# Patient Record
Sex: FEMALE | Race: WHITE | URBAN - METROPOLITAN AREA
[De-identification: names, ages, dates, MRNs, and addresses within clinical notes are randomized per-mention and may not be internally consistent; named-entity substitution may affect disease eponyms.]

---

## 2017-04-19 ENCOUNTER — TRANSFERRED RECORDS (OUTPATIENT)
Dept: HEALTH INFORMATION MANAGEMENT | Facility: CLINIC | Age: 47
End: 2017-04-19

## 2017-05-02 ENCOUNTER — TRANSFERRED RECORDS (OUTPATIENT)
Dept: HEALTH INFORMATION MANAGEMENT | Facility: CLINIC | Age: 47
End: 2017-05-02

## 2017-05-03 ENCOUNTER — MEDICAL CORRESPONDENCE (OUTPATIENT)
Dept: HEALTH INFORMATION MANAGEMENT | Facility: CLINIC | Age: 47
End: 2017-05-03

## 2017-05-08 ENCOUNTER — TRANSFERRED RECORDS (OUTPATIENT)
Dept: HEALTH INFORMATION MANAGEMENT | Facility: CLINIC | Age: 47
End: 2017-05-08

## 2017-05-08 ENCOUNTER — TELEPHONE (OUTPATIENT)
Dept: OBGYN | Facility: CLINIC | Age: 47
End: 2017-05-08

## 2017-05-08 NOTE — TELEPHONE ENCOUNTER
Spoke with nurse Mere at [facility] about this patient as she was referred to us for fibroids. She wanted to let us know that the patient recently had a  level drawn and it was 35. They also wanted us to review her most recent CT scan and views of the left ovary to determine if it is appropriate that she wait until June 8th appointment with Olivia or if she should be seen sooner (or be seen at gyn/onc).     Discussed with Dr. Prater. Pt should have a pelvic US if she has not had one done already. She will consult with team to determine if pt should be seen sooner or at gyn/onc. Left message with Mere at [facility] to call back and let us know if pt has a recent pelvic US otherwise will need to place orders for one and have pt schedule at [facility] for this. Pipe # is [phone] if needed.

## 2017-05-09 NOTE — TELEPHONE ENCOUNTER
Received ultrasound and  records. Reviewed with Dr. Akhtar who advised OK to be seen as scheduled.

## 2017-06-01 ENCOUNTER — MEDICAL CORRESPONDENCE (OUTPATIENT)
Dept: HEALTH INFORMATION MANAGEMENT | Facility: CLINIC | Age: 47
End: 2017-06-01

## 2017-06-07 ASSESSMENT — ANXIETY QUESTIONNAIRES
GAD7 TOTAL SCORE: 0
6. BECOMING EASILY ANNOYED OR IRRITABLE: NOT AT ALL
7. FEELING AFRAID AS IF SOMETHING AWFUL MIGHT HAPPEN: NOT AT ALL
GAD7 TOTAL SCORE: 0
1. FEELING NERVOUS, ANXIOUS, OR ON EDGE: NOT AT ALL
4. TROUBLE RELAXING: NOT AT ALL
GAD7 TOTAL SCORE: 0
2. NOT BEING ABLE TO STOP OR CONTROL WORRYING: NOT AT ALL
3. WORRYING TOO MUCH ABOUT DIFFERENT THINGS: NOT AT ALL
7. FEELING AFRAID AS IF SOMETHING AWFUL MIGHT HAPPEN: NOT AT ALL
5. BEING SO RESTLESS THAT IT IS HARD TO SIT STILL: NOT AT ALL

## 2017-06-07 ASSESSMENT — ENCOUNTER SYMPTOMS
HOARSE VOICE: 1
SINUS PAIN: 0
WEIGHT GAIN: 1
MUSCLE WEAKNESS: 0
TASTE DISTURBANCE: 0
POLYDIPSIA: 0
TROUBLE SWALLOWING: 0
FLANK PAIN: 0
HALLUCINATIONS: 0
NECK MASS: 0
JOINT SWELLING: 0
CHILLS: 0
DYSURIA: 0
BACK PAIN: 0
STIFFNESS: 0
DIARRHEA: 0
INCREASED ENERGY: 0
WEIGHT LOSS: 0
MYALGIAS: 1
DIFFICULTY URINATING: 0
NECK PAIN: 1
BOWEL INCONTINENCE: 0
DECREASED APPETITE: 0
SORE THROAT: 1
JAUNDICE: 0
CONSTIPATION: 1
HEMATURIA: 0
ALTERED TEMPERATURE REGULATION: 0
FATIGUE: 0
BLOATING: 1
HEARTBURN: 0
MUSCLE CRAMPS: 1
NIGHT SWEATS: 0
RECTAL BLEEDING: 0
POLYPHAGIA: 0
BLOOD IN STOOL: 0
ABDOMINAL PAIN: 0
NAUSEA: 0
FEVER: 0
VOMITING: 0
ARTHRALGIAS: 0
SINUS CONGESTION: 1
RECTAL PAIN: 0
SMELL DISTURBANCE: 0

## 2017-06-08 ENCOUNTER — OFFICE VISIT (OUTPATIENT)
Dept: OBGYN | Facility: CLINIC | Age: 47
End: 2017-06-08
Attending: OBSTETRICS & GYNECOLOGY
Payer: COMMERCIAL

## 2017-06-08 ENCOUNTER — OFFICE VISIT (OUTPATIENT)
Dept: RADIOLOGY | Facility: CLINIC | Age: 47
End: 2017-06-08
Attending: OBSTETRICS & GYNECOLOGY
Payer: COMMERCIAL

## 2017-06-08 VITALS
WEIGHT: 182 LBS | BODY MASS INDEX: 28.56 KG/M2 | HEART RATE: 76 BPM | DIASTOLIC BLOOD PRESSURE: 86 MMHG | HEIGHT: 67 IN | SYSTOLIC BLOOD PRESSURE: 143 MMHG

## 2017-06-08 DIAGNOSIS — N83.202 LEFT OVARIAN CYST: ICD-10-CM

## 2017-06-08 DIAGNOSIS — D25.1 INTRAMURAL LEIOMYOMA OF UTERUS: Primary | ICD-10-CM

## 2017-06-08 DIAGNOSIS — D25.9 UTERINE LEIOMYOMA, UNSPECIFIED LOCATION: Primary | ICD-10-CM

## 2017-06-08 PROBLEM — I10 HYPERTENSION: Status: ACTIVE | Noted: 2017-06-08

## 2017-06-08 PROCEDURE — 99213 OFFICE O/P EST LOW 20 MIN: CPT | Mod: ZF

## 2017-06-08 PROCEDURE — 88305 TISSUE EXAM BY PATHOLOGIST: CPT | Performed by: OBSTETRICS & GYNECOLOGY

## 2017-06-08 PROCEDURE — 58100 BIOPSY OF UTERUS LINING: CPT | Mod: ZF | Performed by: OBSTETRICS & GYNECOLOGY

## 2017-06-08 RX ORDER — LISINOPRIL 20 MG/1
20 TABLET ORAL EVERY MORNING
COMMUNITY
Start: 2016-11-01

## 2017-06-08 RX ORDER — LISINOPRIL 10 MG/1
10 TABLET ORAL
COMMUNITY
Start: 2015-06-11 | End: 2017-06-08

## 2017-06-08 RX ORDER — DIPHENOXYLATE HYDROCHLORIDE AND ATROPINE SULFATE 2.5; .025 MG/1; MG/1
1 TABLET ORAL
COMMUNITY
Start: 2015-06-11

## 2017-06-08 ASSESSMENT — PAIN SCALES - GENERAL: PAINLEVEL: NO PAIN (0)

## 2017-06-08 ASSESSMENT — ANXIETY QUESTIONNAIRES: GAD7 TOTAL SCORE: 0

## 2017-06-08 NOTE — MR AVS SNAPSHOT
After Visit Summary   6/8/2017    Fabián Phoenix    MRN: [id number]           Patient Information     Date Of Birth          1970        Visit Information        Provider Department      6/8/2017 11:30 AM Alyce Sommers APRN [facility]        Today's Diagnoses     Uterine leiomyoma, unspecified location    -  1       Follow-ups after your visit        Follow-up notes from your care team     Discussed this visit      Your next 10 appointments already scheduled     Jun 12, 2017  9:30 AM CDT   ULTRASOUND with [facility] ULTRASOUND   [facility] ([facility])    [facility] [address]  [address]  [facility] [address] [phone]            Jul 13, 2017  9:00 AM CDT   Return Visit with Veronika Baker MD   [facility] ([facility])    [facility] [address]  [address]  [facility] [address] [phone]              Who to contact     Please call your clinic at [phone] to:    Ask questions about your health    Make or cancel appointments    Discuss your medicines    Learn about your test results    Speak to your doctor   If you have compliments or concerns about an experience at your clinic, or if you wish to file a complaint, please contact [facility] Patient Relations at [phone] or email us at [email or web address]         Additional Information About Your Visit        MyChart Information     Woogat gives you secure access to your electronic health record. If you see a primary care provider, you can also send messages to your care team and make appointments. If you have questions, please call your primary care clinic.  If you do not have a primary care provider, please call [phone] and they will assist you.      enavu is an electronic gateway that provides easy,  online access to your medical records. With StreamStar, you can request a clinic appointment, read your test results, renew a prescription or communicate with your care team.     To access your existing account, please contact your [facility] or call [phone] for assistance.        Care EveryWhere ID     This is your Care EveryWhere ID. This could be used by other organizations to access your [facility] medical records  [id number]        Your Vitals Were     Last Period                   05/29/2017            Blood Pressure from Last 3 Encounters:   06/08/17 143/86   02/05/11 128/80    Weight from Last 3 Encounters:   06/08/17 82.6 kg (182 lb)              Today, you had the following     No orders found for display         Today's Medication Changes          These changes are accurate as of: 6/8/17  4:28 PM.  If you have any questions, ask your nurse or doctor.               These medicines have changed or have updated prescriptions.        Dose/Directions    lisinopril 20 MG tablet   Commonly known as:  PRINIVIL/ZESTRIL   This may have changed:  Another medication with the same name was removed. Continue taking this medication, and follow the directions you see here.   Changed by:  Veronika Baker MD        Dose:  20 mg   Take 20 mg by mouth daily   Refills:  0                Primary Care Provider Office Phone # Fax #    Mere Alvarado [phone]       [address] [address]        Thank you!     Thank you for choosing [facility]  for your care. Our goal is always to provide you with excellent care. Hearing back from our patients is one way we can continue to improve our services. Please take a few minutes to complete the written survey that you may receive in the mail after your visit with us. Thank you!             Your Updated Medication List - Protect others around you: Learn how to  safely use, store and throw away your medicines at www.disposemymeds.org.          This list is accurate as of: 6/8/17  4:28 PM.  Always use your most recent med list.                   Brand Name Dispense Instructions for use    lisinopril 20 MG tablet    PRINIVIL/ZESTRIL     Take 20 mg by mouth daily       loratadine-pseudoePHEDrine  MG per 24 hr tablet    CLARITIN-D 24-hour     Take 1 tablet by mouth       MULTI-VITAMINS Tabs      Take 1 tablet by mouth       order for DME     1 Device    Ankle brace

## 2017-06-08 NOTE — MR AVS SNAPSHOT
"              After Visit Summary   6/8/2017    Fabián Phoenix    MRN: [id number]           Patient Information     Date Of Birth          1970        Visit Information        Provider Department      6/8/2017 8:45 AM Veronika Baker MD [facility]         Follow-ups after your visit        Who to contact     Please call your clinic at [phone] to:    Ask questions about your health    Make or cancel appointments    Discuss your medicines    Learn about your test results    Speak to your doctor   If you have compliments or concerns about an experience at your clinic, or if you wish to file a complaint, please contact [facility] Patient Relations at [phone] or email us at [email or web address]         Additional Information About Your Visit        MyChart Information     Hotelcloudt gives you secure access to your electronic health record. If you see a primary care provider, you can also send messages to your care team and make appointments. If you have questions, please call your primary care clinic.  If you do not have a primary care provider, please call [phone] and they will assist you.      Med Access is an electronic gateway that provides easy, online access to your medical records. With Med Access, you can request a clinic appointment, read your test results, renew a prescription or communicate with your care team.     To access your existing account, please contact your [facility] or call [phone] for assistance.        Care EveryWhere ID     This is your Care EveryWhere ID. This could be used by other organizations to access your [facility] medical records  [id number]        Your Vitals Were     Pulse Height Last Period Breastfeeding? BMI (Body Mass Index)       76 1.695 m (5' 6.75\") 05/29/2017 No 28.72 kg/m2        Blood Pressure from Last 3 Encounters:   06/08/17 143/86   02/05/11 128/80    " Weight from Last 3 Encounters:   06/08/17 82.6 kg (182 lb)              Today, you had the following     No orders found for display         Today's Medication Changes          These changes are accurate as of: 6/8/17 10:03 AM.  If you have any questions, ask your nurse or doctor.               These medicines have changed or have updated prescriptions.        Dose/Directions    lisinopril 20 MG tablet   Commonly known as:  PRINIVIL/ZESTRIL   This may have changed:  Another medication with the same name was removed. Continue taking this medication, and follow the directions you see here.   Changed by:  Veronika Baker MD        Dose:  20 mg   Take 20 mg by mouth daily   Refills:  0                Primary Care Provider Office Phone # Fax #    Mere [facility] [phone]       [address] [address]        Thank you!     Thank you for choosing [facility]  for your care. Our goal is always to provide you with excellent care. Hearing back from our patients is one way we can continue to improve our services. Please take a few minutes to complete the written survey that you may receive in the mail after your visit with us. Thank you!             Your Updated Medication List - Protect others around you: Learn how to safely use, store and throw away your medicines at www.disposemymeds.org.          This list is accurate as of: 6/8/17 10:03 AM.  Always use your most recent med list.                   Brand Name Dispense Instructions for use    lisinopril 20 MG tablet    PRINIVIL/ZESTRIL     Take 20 mg by mouth daily       loratadine-pseudoePHEDrine  MG per 24 hr tablet    CLARITIN-D 24-hour     Take 1 tablet by mouth       MULTI-VITAMINS Tabs      Take 1 tablet by mouth       order for DME     1 Device    Ankle brace

## 2017-06-08 NOTE — PROGRESS NOTES
45 yo P0 with history of infertility and fibroid uterus presents to discuss worsening symptoms of bloating, constipation and pelvic pressure feels which are attributed to a growth in the uterine fibroids.    Patient is perimenopausal and had a single irregular menses in January.  Typically her cycles are 23-26 days, no significant pain with menses and no heavy bleeding.  She has noted increased urinary frequency.  Some urgency and some urge related loss. Gets up at night in last 6-12 months, sometimes more than once.    Her biggest complaint is bloating and constipation.  She has added some probiotics to help with regularity, but she still struggles with straining with bowel movements which has never previously been an issue.    She is sexually active with her  who is 11 years younger. She occasionally will have pain with intercourse, but this is manageable with position changes.  She is concerned about changes to libido with menopause.    She has had a pelvic ultrasound done at [facility] which showed an enlarged uterus, but did not visualize ovaries.  A CA-125 was ordered and was minimally elevated at 35.  A subsequent abdominal/pelvic CT scan showed a multilocular left ovarian cyst and enlarged uterus and was otherwise normal.    Answers for HPI/ROS submitted by the patient on 6/7/2017   GEE 7 TOTAL SCORE: 0  PHQ-2 Score: 0  General Symptoms: Yes  Skin Symptoms: No  HENT Symptoms: Yes  EYE SYMPTOMS: No  HEART SYMPTOMS: No  LUNG SYMPTOMS: No  INTESTINAL SYMPTOMS: Yes  URINARY SYMPTOMS: Yes  GYNECOLOGIC SYMPTOMS: No  BREAST SYMPTOMS: No  SKELETAL SYMPTOMS: Yes  BLOOD SYMPTOMS: No  NERVOUS SYSTEM SYMPTOMS: No  MENTAL HEALTH SYMPTOMS: No  Fever: No  Loss of appetite: No  Weight loss: No  Weight gain: Yes  Fatigue: No  Night sweats: No  Chills: No  Increased stress: Yes  Excessive hunger: No  Excessive thirst: No  Feeling hot or cold when others believe the temperature is normal: No  Loss of height:  No  Post-operative complications: No  Surgical site pain: No  Hallucinations: No  Change in or Loss of Energy: No  Hyperactivity: No  Confusion: No  Ear pain: No  Ear discharge: No  Hearing loss: No  Tinnitus: No  Nosebleeds: No  Congestion: Yes  Sinus pain: No  Trouble swallowing: No   Voice hoarseness: Yes  Mouth sores: No  Sore throat: Yes  Tooth pain: No  Gum tenderness: No  Bleeding gums: No  Change in taste: No  Change in sense of smell: No  Dry mouth: No  Hearing aid used: No  Neck lump: No  Heart burn or indigestion: No  Nausea: No  Vomiting: No  Abdominal pain: No  Bloating: Yes  Constipation: Yes  Diarrhea: No  Blood in stool: No  Black stools: No  Rectal or Anal pain: No  Fecal incontinence: No  Rectal bleeding: No  Yellowing of skin or eyes: No  Vomit with blood: No  Change in stools: No  Hemorrhoids: No  Trouble holding urine or incontinence: Yes  Pain or burning: No  Trouble starting or stopping: No  Increased frequency of urination: No  Blood in urine: No  Decreased frequency of urination: No  Frequent nighttime urination: No  Flank pain: No  Difficulty emptying bladder: No  Back pain: No  Muscle aches: Yes  Neck pain: Yes  Swollen joints: No  Joint pain: No  Bone pain: No  Muscle cramps: Yes  Muscle weakness: No  Joint stiffness: No  Bone fracture: No    Past Medical History:   Diagnosis Date     Abnormal Pap smear 2002    LEEP at [place]     Herpes simplex without mention of complication 1997    rarely an issue     Hypertension 2015    controlled with Lisinopril     Thyroid disease 200x?    I have the thyroid antibody but levels remain normal.     Past Surgical History:   Procedure Laterality Date     GYN SURGERY  2002    LEEP     history of LEEP x 2, follow-up has been through [facility] and all normal  Known fibroid uterus, diagnosed with fertility treatment in 2010, intramural and measured 3.2 and 4.3 cm in 2010  Went through several cycles of clomid and IUI then but did not conceive.    Family  "History   Problem Relation Age of Onset     Hypertension Mother      Coronary Artery Disease Mother      Coronary Artery Disease Father      DIABETES Father      KIDNEY DISEASE Father      Hypertension Brother        Social history:   at the [employer]      Physical exam:  /86  Pulse 76  Ht 1.695 m (5' 6.75\")  Wt 82.6 kg (182 lb)  LMP 05/29/2017  Breastfeeding? No  BMI 28.72 kg/m2  Gen'l: mildly anxious but in no acute distress  CV: regular rhythm  Resp: non-labored breathing  Abd: soft, NT, ND, palpable mass in LLQ, mobile, consistent with uterine fibroid  Vulva: normal, no lesions  Urethra: normal  Vagina: pink, normal rugae, phsyiologic discharge present  Cervix: nulliparous, no lesion, no bleeding, deviated to patient's right  Uterus: mobile, NT, globular with palpable fibroids, 12 weeks in size  Adnexa: no palpable masses, NT  Rectum: Anus normal, no rectovaginal exam done    U/s 4/19/2017:  Uterus 12.4 x 10.6 x 8.5 cm endometrium 12mm, multiple leiomyomas.  2 largests in right mid uterus and left mid uterus, 6.3 x 4.0  X 5.5 cm and 4.2 x 3.9 x 3.4 cm, other smaller leiomyomas.  Ovaries not visualized, small cul-de-sac fluid    CT 5/5/2017:  enlargesd heterogeneous and lobulated uterus, normal right ovary, enlarged, multicystic and septated left ovary measuring up to 4.2 cm.  Otherwise normal CT    Assessment/Plan  47 yo P0 with enlarged fibroid uterus and bulk symptoms  Multicystic left ovary    - Medical and surgical options which are applicable for differing symptoms were reviewed including: OCPs, Mirena IUD, hysteroscopy, ablation, myomectomy, UFE, u/s surgery at [facility] and hysterectomy.  Discussed in given patient does not desire for future pregnancy, and symptoms most related to bulk likely best options include UFE and hysterectomy.  Patient is considering UFE and was seen by IR today as well.  Written information on Fibroids given.  - Although no abnormal bleeding recommend " "biopsy to rule out unsuspected hyperplasia or malignancy prior to surgery or UFE.  Patient agrees to endometrial biopsy and this was preformed today (see below).  - Discussed finding of enlarged left ovary and slight elevation of CA-125.  Elevated CA-125 is likely due to fibroid uterus and it is difficult to characterize the left ovarian cysts based on CT scan.  Recommend repeat ultrasound to assess ovaries and better characterize cysts.  Reassured patient that given symptoms and findings on exam unlikely a malignancy  - Patient will return for ultrasound and to discuss final treatment planning.  May call if leaning toward hysterectomy as would likely require robotic assisted laparoscopic hysterectomy and this may take some time to schedule.  -Thank you very much for this interesting consultation.  If you have any questions or concerns please do not hesitate to page me directly.  Veronika Baker MD     Endometrial Biopsy                                                                       Time Out - \"Pause for the Cause\"  Just before the procedure begins, through verbal and active participation of team members, verify:    Initials   Patient Name    mip   Patient Date of Birth mip   Procedure to be performed  mip   Site, laterality, level or multiples, noting patient position   mip   Relevant images or diagnostics available/displayed   mip   Implants, special equipment or special requirements        mip     Consent:  Verbal consent obtained from patient. , Risks, benefits of treatment, and no treatment were discussed.  Patient's questions were elicited and answered.  and Written consent signed and scanned into medical record.    Indication: pre-procedure planning  Faculty:[facility]    Using a medium Graves speculum, the cervix was visualized. The cervix was prepped with Betadine.  A single tooth tenaculum was applied to the anterior lip of the cervix. The endometrial pipelle was advanced through the cervix without " difficulty and a sample collected. No additional pass was made.  The tenaculum was removed from the cervix and the tenaculum site made hemostatic with pressure.    EBL: scant  Complications:  None apparent  Pathology: EMB sample was sent to pathology.  Tolerance of Procedure:  Patient did tolerate the procedure well.     Patient was instructed to call if she experiences any heavy bleeding, severe cramping, or abnormal vaginal discharge.  May take ibuprofen 400-800 mg PO TID PRN or naproxen 500 mg PO BID for cramping.    Will notify patient of results.    Veronika Baker MD

## 2017-06-08 NOTE — LETTER
6/8/2017     RE: Fabián Phoenix  [address] [facility] [place] [address]     Dear Colleague,    Thank you for referring your patient, Fabián Phoenix, to the [facility] at [facility]. Please see a copy of my visit note below.    47 yo P0 with history of infertility and fibroid uterus presents to discuss worsening symptoms of bloating, constipation and pelvic pressure feels which are attributed to a growth in the uterine fibroids.    Patient is perimenopausal and had a single irregular menses in January.  Typically her cycles are 23-26 days, no significant pain with menses and no heavy bleeding.  She has noted increased urinary frequency.  Some urgency and some urge related loss. Gets up at night in last 6-12 months, sometimes more than once.    Her biggest complaint is bloating and constipation.  She has added some probiotics to help with regularity, but she still struggles with straining with bowel movements which has never previously been an issue.    She is sexually active with her  who is 11 years younger. She occasionally will have pain with intercourse, but this is manageable with position changes.  She is concerned about changes to libido with menopause.    She has had a pelvic ultrasound done at [facility] which showed an enlarged uterus, but did not visualize ovaries.  A CA-125 was ordered and was minimally elevated at 35.  A subsequent abdominal/pelvic CT scan showed a multilocular left ovarian cyst and enlarged uterus and was otherwise normal.    Answers for HPI/ROS submitted by the patient on 6/7/2017   GEE 7 TOTAL SCORE: 0  PHQ-2 Score: 0  General Symptoms: Yes  Skin Symptoms: No  HENT Symptoms: Yes  EYE SYMPTOMS: No  HEART SYMPTOMS: No  LUNG SYMPTOMS: No  INTESTINAL SYMPTOMS: Yes  URINARY SYMPTOMS: Yes  GYNECOLOGIC SYMPTOMS: No  BREAST SYMPTOMS: No  SKELETAL SYMPTOMS: Yes  BLOOD SYMPTOMS: No  NERVOUS SYSTEM SYMPTOMS: No  MENTAL  HEALTH SYMPTOMS: No  Fever: No  Loss of appetite: No  Weight loss: No  Weight gain: Yes  Fatigue: No  Night sweats: No  Chills: No  Increased stress: Yes  Excessive hunger: No  Excessive thirst: No  Feeling hot or cold when others believe the temperature is normal: No  Loss of height: No  Post-operative complications: No  Surgical site pain: No  Hallucinations: No  Change in or Loss of Energy: No  Hyperactivity: No  Confusion: No  Ear pain: No  Ear discharge: No  Hearing loss: No  Tinnitus: No  Nosebleeds: No  Congestion: Yes  Sinus pain: No  Trouble swallowing: No   Voice hoarseness: Yes  Mouth sores: No  Sore throat: Yes  Tooth pain: No  Gum tenderness: No  Bleeding gums: No  Change in taste: No  Change in sense of smell: No  Dry mouth: No  Hearing aid used: No  Neck lump: No  Heart burn or indigestion: No  Nausea: No  Vomiting: No  Abdominal pain: No  Bloating: Yes  Constipation: Yes  Diarrhea: No  Blood in stool: No  Black stools: No  Rectal or Anal pain: No  Fecal incontinence: No  Rectal bleeding: No  Yellowing of skin or eyes: No  Vomit with blood: No  Change in stools: No  Hemorrhoids: No  Trouble holding urine or incontinence: Yes  Pain or burning: No  Trouble starting or stopping: No  Increased frequency of urination: No  Blood in urine: No  Decreased frequency of urination: No  Frequent nighttime urination: No  Flank pain: No  Difficulty emptying bladder: No  Back pain: No  Muscle aches: Yes  Neck pain: Yes  Swollen joints: No  Joint pain: No  Bone pain: No  Muscle cramps: Yes  Muscle weakness: No  Joint stiffness: No  Bone fracture: No    Past Medical History:   Diagnosis Date     Abnormal Pap smear 2002    LEEP at [place]     Herpes simplex without mention of complication 1997    rarely an issue     Hypertension 2015    controlled with Lisinopril     Thyroid disease 200x?    I have the thyroid antibody but levels remain normal.     Past Surgical History:   Procedure Laterality Date     GYN SURGERY   "2002    LEEP     history of LEEP x 2, follow-up has been through [place] and all normal  Known fibroid uterus, diagnosed with fertility treatment in 2010, intramural and measured 3.2 and 4.3 cm in 2010  Went through several cycles of clomid and IUI then but did not conceive.    Family History   Problem Relation Age of Onset     Hypertension Mother      Coronary Artery Disease Mother      Coronary Artery Disease Father      DIABETES Father      KIDNEY DISEASE Father      Hypertension Brother        Social history:   at the [employer]      Physical exam:  /86  Pulse 76  Ht 1.695 m (5' 6.75\")  Wt 82.6 kg (182 lb)  LMP 05/29/2017  Breastfeeding? No  BMI 28.72 kg/m2  Gen'l: mildly anxious but in no acute distress  CV: regular rhythm  Resp: non-labored breathing  Abd: soft, NT, ND, palpable mass in LLQ, mobile, consistent with uterine fibroid  Vulva: normal, no lesions  Urethra: normal  Vagina: pink, normal rugae, phsyiologic discharge present  Cervix: nulliparous, no lesion, no bleeding, deviated to patient's right  Uterus: mobile, NT, globular with palpable fibroids, 12 weeks in size  Adnexa: no palpable masses, NT  Rectum: Anus normal, no rectovaginal exam done    U/s 4/19/2017:  Uterus 12.4 x 10.6 x 8.5 cm endometrium 12mm, multiple leiomyomas.  2 largests in right mid uterus and left mid uterus, 6.3 x 4.0  X 5.5 cm and 4.2 x 3.9 x 3.4 cm, other smaller leiomyomas.  Ovaries not visualized, small cul-de-sac fluid    CT 5/5/2017:  enlargesd heterogeneous and lobulated uterus, normal right ovary, enlarged, multicystic and septated left ovary measuring up to 4.2 cm.  Otherwise normal CT    Assessment/Plan  47 yo P0 with enlarged fibroid uterus and bulk symptoms  Multicystic left ovary    - Medical and surgical options which are applicable for differing symptoms were reviewed including: OCPs, Mirena IUD, hysteroscopy, ablation, myomectomy, UFE, u/s surgery at [place] and hysterectomy.  " "Discussed in given patient does not desire for future pregnancy, and symptoms most related to bulk likely best options include UFE and hysterectomy.  Patient is considering UFE and was seen by IR today as well.  Written information on Fibroids given.  - Although no abnormal bleeding recommend biopsy to rule out unsuspected hyperplasia or malignancy prior to surgery or UFE.  Patient agrees to endometrial biopsy and this was preformed today (see below).  - Discussed finding of enlarged left ovary and slight elevation of CA-125.  Elevated CA-125 is likely due to fibroid uterus and it is difficult to characterize the left ovarian cysts based on CT scan.  Recommend repeat ultrasound to assess ovaries and better characterize cysts.  Reassured patient that given symptoms and findings on exam unlikely a malignancy  - Patient will return for ultrasound and to discuss final treatment planning.  May call if leaning toward hysterectomy as would likely require robotic assisted laparoscopic hysterectomy and this may take some time to schedule.  -Thank you very much for this interesting consultation.  If you have any questions or concerns please do not hesitate to page me directly.  Veronika Baker MD     Endometrial Biopsy                                                                       Time Out - \"Pause for the Cause\"  Just before the procedure begins, through verbal and active participation of team members, verify:    Initials   Patient Name    mip   Patient Date of Birth mip   Procedure to be performed  mip   Site, laterality, level or multiples, noting patient position   mip   Relevant images or diagnostics available/displayed   mip   Implants, special equipment or special requirements        mip     Consent:  Verbal consent obtained from patient. , Risks, benefits of treatment, and no treatment were discussed.  Patient's questions were elicited and answered.  and Written consent signed and scanned into medical " record.    Indication: pre-procedure planning  Faculty:Olivia    Using a medium Graves speculum, the cervix was visualized. The cervix was prepped with Betadine.  A single tooth tenaculum was applied to the anterior lip of the cervix. The endometrial pipelle was advanced through the cervix without difficulty and a sample collected. No additional pass was made.  The tenaculum was removed from the cervix and the tenaculum site made hemostatic with pressure.    EBL: scant  Complications:  None apparent  Pathology: EMB sample was sent to pathology.  Tolerance of Procedure:  Patient did tolerate the procedure well.     Patient was instructed to call if she experiences any heavy bleeding, severe cramping, or abnormal vaginal discharge.  May take ibuprofen 400-800 mg PO TID PRN or naproxen 500 mg PO BID for cramping.    Will notify patient of results.    Veronika Baker MD

## 2017-06-08 NOTE — PROGRESS NOTES
"First Name: Fabián Age: 46 year old Referring Physician: Dr. Olivia PASTRANA  This is a patient who was diagnosed with uterine fibroids in 2010 when she underwent infertility treatment.   She did not conceive.  An ultrasound at the time showed two intramural fibroids that measured 3.2 and 4.3 cm.  She recently was seen at [facility] for her [facility] exam.  She endorses bloating, constipation and pelvic pressure she feels are from the fibroids.  Menses are typically every 23-26 days, no significant pain with menses. Has had increased urinary frequency.  Some urgency and some urge related loss. Gets up at night in last 6-12 months, sometimes more than once. She had an ultrasound 4/19/2017:   The anteverted uterus is enlarged measuring 12.4 x 10.6 x 8.5 cm.  The endometrium where visualized measures 12 mm.  There multiple uterine leiomyomas.  The 2 largest lesions occupy the right mid uterus and left mid uterus and measure 6.3 x 4.0 x 5.5 cm and 4.2 x 3.9 x 3.4 cm respectively.  Other smaller leiomyomas are present  The ovaries could not be visualized.    She then had a CT scan which showed a multicystic left ovary.    Fabián came today to discuss her treatment options.  She met with Dr. Baker and myself today.    Past Surgical History:   Procedure Laterality Date     GYN SURGERY  2002    LEE       Patient Active Problem List   Diagnosis     Uterine leiomyoma, unspecified location     Hypertension     Vital Signs 6/8/2017   Systolic 143   Diastolic 86   Pulse 76   Respirations    Weight (LB) 182 lb   Height 5' 6.75\"   BMI (Calculated) 28.78   Pain        ROS    General: none  Head/Eyes: none  Ears/Nose/Throat: none  Cardiovascular: none  Respiratory: none  Gastrointestinal: constipation  Breast: none  Genitourinary: frequency, urgency, up at night  Sexual Function: pain with intercourse  Musculoskeletal: none  Skin: none  Neurological: none  Mental Health: none  Endocrine: none    PHYSICAL EXAM:  Pt does have a hx " "of HTN, vital signs are stable  Pleasant, middle aged woman, in no acute physical distress, came alone to her appt    Ultrasound and date: 4/19/2017:  The anteverted uterus is enlarged measuring 12.4 x 10.6 x 8.5 cm.  The endometrium where visualized measures 12 mm.  There multiple uterine leiomyomas.  The 2 largest lesions occupy the right mid uterus and left mid uterus and measure 6.3 x 4.0 x 5.5 cm and 4.2 x 3.9 x 3.4 cm respectively.  Other smaller leiomyomas are present.  The ovaries were not visualized..  A CA-125 was ordered and was minimally elevated at 35.  A subsequent abdominal/pelvic CT scan showed a multilocular left ovarian cyst and enlarged uterus and was otherwise normal    Pap smear and date: history of LEEP x 2 in 2002, follow-up has been through [place] and all normal      Endometrail biopsy and date: performed today    PROVIDER SECTION    I explained the uterine fibroid embolization procedure by proceeding through coming pre-procedure, getting ready, what would happen during the procedure, what to expect post procedure, overnight stay in the hospital and post procedure pain.  I explained what the recovery would be like, including post-embolization syndrome.    I explained that the benefits of the procedure would be that she would have 1)improvement in her bleeding, 2)shrinkage of the fibroid and uterus, 3)decrease urinary frequency, 4)improvement in constipation.  I discussed the potential complications that occur:  Infection at the puncture site at the groin, bruise/hematoma at the groin, damage to the arteries from the wires/catheters, infection or infarction of the uterus necessitating an emergent hysterectomy.  A blood clot in the leg.    The patient was given written materials on uterine fibroids, the uterine fibroid embolization procedure, a brochure on the [employer] web site, the patient education brochure \"About Your Angiogram\" and the patient information about scheduling for the procedure, " things to expect post procedure and important phone numbers.    The patient was given ample opportunity to ask questions.  The patient stated she understood the material I discussed with her.    ASSESSMENT:  Fabián is a 47 yo with symptomatic uterine fibroids.  She is considering to have a UFE procedure performed.    Await EMB results.  Patient would need an MRI.  Patient is going to discuss all the options with her , read over the materials and then make a decision.    25 minutes was spent with Fabián.  Alyce Sommers MS, APRN, CNS  Clinical Nurse Specialist  Interventional Radiology  [phone] (voice mail)  [phone] (pager)

## 2017-06-12 ENCOUNTER — OFFICE VISIT (OUTPATIENT)
Dept: OBGYN | Facility: CLINIC | Age: 47
End: 2017-06-12
Attending: OBSTETRICS & GYNECOLOGY
Payer: COMMERCIAL

## 2017-06-12 DIAGNOSIS — D25.1 INTRAMURAL LEIOMYOMA OF UTERUS: ICD-10-CM

## 2017-06-12 DIAGNOSIS — N83.202 LEFT OVARIAN CYST: ICD-10-CM

## 2017-06-12 PROCEDURE — 76857 US EXAM PELVIC LIMITED: CPT | Mod: ZF

## 2017-06-12 NOTE — MR AVS SNAPSHOT
After Visit Summary   6/12/2017    Fabián Phoenix    MRN: [id number]           Patient Information     Date Of Birth          1970        Visit Information        Provider Department      6/12/2017 9:30 AM [facility] ULTRASOUND [facility]        Today's Diagnoses     Intramural leiomyoma of uterus        Left ovarian cyst           Follow-ups after your visit        Your next 10 appointments already scheduled     Jul 13, 2017  9:00 AM CDT   Return Visit with Veronika Baker MD   [facility] ([facility])    [facility] [address]  [address]  [address]  [facility] [id number] [phone]              Who to contact     Please call your clinic at [phone] to:    Ask questions about your health    Make or cancel appointments    Discuss your medicines    Learn about your test results    Speak to your doctor   If you have compliments or concerns about an experience at your clinic, or if you wish to file a complaint, please contact [facility] Patient Relations at [phone] or email us at [email or web address]         Additional Information About Your Visit        MyChart Information     [employer] gives you secure access to your electronic health record. If you see a primary care provider, you can also send messages to your care team and make appointments. If you have questions, please call your primary care clinic.  If you do not have a primary care provider, please call [phone] and they will assist you.      [employer] is an electronic gateway that provides easy, online access to your medical records. With [employer], you can request a clinic appointment, read your test results, renew a prescription or communicate with your care team.     To access your existing account, please contact your [facility] or call [phone] for assistance.        Care  EveryWhere ID     This is your Care EveryWhere ID. This could be used by other organizations to access your [place] medical records  [id number]        Your Vitals Were     Last Period                   05/29/2017            Blood Pressure from Last 3 Encounters:   06/08/17 143/86   02/05/11 128/80    Weight from Last 3 Encounters:   06/08/17 82.6 kg (182 lb)               Primary Care Provider Office Phone # Fax #    Mere Alvarado [phone]       [address] [address]        Thank you!     Thank you for choosing [facility]  for your care. Our goal is always to provide you with excellent care. Hearing back from our patients is one way we can continue to improve our services. Please take a few minutes to complete the written survey that you may receive in the mail after your visit with us. Thank you!             Your Updated Medication List - Protect others around you: Learn how to safely use, store and throw away your medicines at www.disposemymeds.org.          This list is accurate as of: 6/12/17 11:59 PM.  Always use your most recent med list.                   Brand Name Dispense Instructions for use    lisinopril 20 MG tablet    PRINIVIL/ZESTRIL     Take 20 mg by mouth daily       loratadine-pseudoePHEDrine  MG per 24 hr tablet    CLARITIN-D 24-hour     Take 1 tablet by mouth       MULTI-VITAMINS Tabs      Take 1 tablet by mouth       order for DME     1 Device    Ankle brace

## 2017-06-12 NOTE — PROGRESS NOTES
46 year old female with LMP 05/29/2017 presents for gynecologic ultrasound indicated by left ovarian cyst, h/o fibroids.  This study was done transvaginally.    Uterine findings:   Presence: Visible Size: Abnormal, enlarged.  Posterior pedunculated myomas with combined measurement of - 10.1 x 11.5 x 6.3cm.          Pelvic findings:    Right Adnexa: Normal   Left Adnexa: Normal   Bladder:  Normal         Cul - de - sac fluid: None    Ovarian follicles:   Right ovary:  3.9x2.5x2.4cm.     1 follicles     Size(s):  19 x 11 x 12mm     Left ovary:  2.5x5.3x4.5cm.     2 follicles     Size(s):  15 x 16mm, 15 x 20mm      Comments:  Posterior pedunculated myomas.  Normal appearing ovaries.        MONSTER Cohen MD

## 2017-06-12 NOTE — LETTER
6/12/2017     RE: Fabián Phoenix  [address] [facility] [place] [address]     Dear Colleague,    Thank you for referring your patient, Fabián Phoenix, to the [facility] at [facility]. Please see a copy of my visit note below.    46 year old female with LMP 05/29/2017 presents for gynecologic ultrasound indicated by left ovarian cyst, h/o fibroids.  This study was done transvaginally.    Uterine findings:   Presence: Visible Size: Abnormal, enlarged.  Posterior pedunculated myomas with combined measurement of - 10.1 x 11.5 x 6.3cm.          Pelvic findings:    Right Adnexa: Normal   Left Adnexa: Normal   Bladder:  Normal         Cul - de - sac fluid: None    Ovarian follicles:   Right ovary:  3.9x2.5x2.4cm.     1 follicles     Size(s):  19 x 11 x 12mm     Left ovary:  2.5x5.3x4.5cm.     2 follicles     Size(s):  15 x 16mm, 15 x 20mm      Comments:  Posterior pedunculated myomas.  Normal appearing ovaries.        MONSTER Cohen MD

## 2017-06-13 ENCOUNTER — TELEPHONE (OUTPATIENT)
Dept: OBGYN | Facility: CLINIC | Age: 47
End: 2017-06-13

## 2017-06-13 NOTE — TELEPHONE ENCOUNTER
Called patient to review ultrasound.  Patient relieved that ovaries appear normal.  Myomas appear pedunculated which may make her not a good candidate for UFE or ultrasound surgery.  Patient now considering ultrasound surgery, but plans to check with insurance.  If covered will need MRI to better evaluate location of myomas.    Veronika Baker MD

## 2017-06-14 LAB — COPATH REPORT: NORMAL

## 2017-06-15 ENCOUNTER — TELEPHONE (OUTPATIENT)
Dept: OBGYN | Facility: CLINIC | Age: 47
End: 2017-06-15

## 2017-06-15 DIAGNOSIS — N85.02 ENDOMETRIAL HYPERPLASIA WITH ATYPIA: Primary | ICD-10-CM

## 2017-06-15 NOTE — TELEPHONE ENCOUNTER
I attempted to reach patient to review EMB pathology results.  No answer.  I left message and asked patient to return call.  I let her know I am in the office today and will be again tomorrow.  Veronika Baker MD

## 2017-06-16 NOTE — TELEPHONE ENCOUNTER
Received callback from Fabián but Dr. Peace is not available. Fabián is available on her cell phone as listed in EPIC.    Forwarding to Dr. Peace.

## 2017-06-16 NOTE — TELEPHONE ENCOUNTER
Appointment made with gyn/onc. Patient notified and she will call back with any questions or concerns.

## 2017-06-16 NOTE — TELEPHONE ENCOUNTER
I was able to reach Fabián (best to call cell phone number) and reviewed the EMB path report- hyperplasia with atypia.  We reviewed that this is a premalignant change to the lining, but there may be areas of malignancy that were not sampled on the biopsy.  We discussed that this means that she should have a hysterectomy to treat the fibroid uterus vs less invasive options.  We discussed that this is best done by a cancer surgeon in case there is any malignancy present.  We discussed that this will likely mean an abdominal hysterectomy given the size of the uterus and the desire to remove the uterus intact; however, this is something that can be reviewed when she sees the oncologist.  We also reviewed that if uterine malignancy present this would likely necessitate removal of the ovaries.  She is anxious to move forward and states that any details related to her care may be left on her cell phone voicemail.  Veronika Baker MD

## 2017-06-19 ASSESSMENT — ENCOUNTER SYMPTOMS
MYALGIAS: 1
SORE THROAT: 1
CONSTIPATION: 1
SINUS CONGESTION: 1
BLOATING: 1

## 2017-06-28 ENCOUNTER — ONCOLOGY VISIT (OUTPATIENT)
Dept: ONCOLOGY | Facility: CLINIC | Age: 47
End: 2017-06-28
Attending: OBSTETRICS & GYNECOLOGY
Payer: COMMERCIAL

## 2017-06-28 VITALS
TEMPERATURE: 98.1 F | RESPIRATION RATE: 17 BRPM | DIASTOLIC BLOOD PRESSURE: 89 MMHG | HEIGHT: 67 IN | SYSTOLIC BLOOD PRESSURE: 155 MMHG | WEIGHT: 183.3 LBS | BODY MASS INDEX: 28.77 KG/M2 | HEART RATE: 72 BPM | OXYGEN SATURATION: 100 %

## 2017-06-28 DIAGNOSIS — N85.02 ENDOMETRIAL HYPERPLASIA WITH ATYPIA: Primary | ICD-10-CM

## 2017-06-28 PROCEDURE — 99212 OFFICE O/P EST SF 10 MIN: CPT | Mod: ZF

## 2017-06-28 PROCEDURE — 99205 OFFICE O/P NEW HI 60 MIN: CPT | Mod: ZP | Performed by: OBSTETRICS & GYNECOLOGY

## 2017-06-28 ASSESSMENT — PAIN SCALES - GENERAL: PAINLEVEL: NO PAIN (0)

## 2017-06-28 NOTE — MR AVS SNAPSHOT
After Visit Summary   6/28/2017    Fabián Phoenix    MRN: [id number]           Patient Information     Date Of Birth          1970        Visit Information        Provider Department      6/28/2017 3:00 PM Byron Cook MD [facility]        Today's Diagnoses     Endometrial hyperplasia with atypia    -  1       Follow-ups after your visit        Your next 10 appointments already scheduled     Jul 13, 2017  9:00 AM CDT   Return Visit with Veronika Baker MD   [facility] ([facility])    [place] [facility] [address]  [address]  [address]  [place] [address]   [phone]            Jul 31, 2017  1:40 PM CDT   (Arrive by 1:25 PM)   Post-Op with Byron Cook MD   [facility] ([facility])    [address] [facility] [address] [address]  2nd Floor  [place] [id number] [phone]              Future tests that were ordered for you today     Open Future Orders        Priority Expected Expires Ordered    Comprehensive metabolic panel Routine  6/28/2018 6/28/2017    CBC with platelets differential Routine  6/28/2018 6/28/2017            Who to contact     If you have questions or need follow up information about today's clinic visit or your schedule please contact [facility] directly at [phone].  Normal or non-critical lab and imaging results will be communicated to you by MyChart, letter or phone within 4 business days after the clinic has received the results. If you do not hear from us within 7 days, please contact the clinic through MyChart or phone. If you have a critical or abnormal lab result, we will notify you by phone as soon as possible.  Submit refill requests through [employer] or call your pharmacy and they will forward the refill request to us. Please allow 3 business days for your refill to be completed.          Additional Information About Your  "Visit        MyChart Information     Newsrepshart gives you secure access to your electronic health record. If you see a primary care provider, you can also send messages to your care team and make appointments. If you have questions, please call your primary care clinic.  If you do not have a primary care provider, please call [phone] and they will assist you.        Care EveryWhere ID     This is your Care EveryWhere ID. This could be used by other organizations to access your [place] medical records  [id number]        Your Vitals Were     Pulse Temperature Respirations Height Last Period Pulse Oximetry    72 98.1  F (36.7  C) (Oral) 17 1.695 m (5' 6.75\") 05/29/2017 100%    BMI (Body Mass Index)                   28.92 kg/m2            Blood Pressure from Last 3 Encounters:   06/28/17 155/89   06/08/17 143/86   02/05/11 128/80    Weight from Last 3 Encounters:   06/28/17 83.1 kg (183 lb 4.8 oz)   06/08/17 82.6 kg (182 lb)              We Performed the Following     ABO/Rh type and screen     Milana-Operative Worksheet - Open/Laparotomy Total Abdominal Hysterectomy, bilateral Salpingo-Oopherectomy, possible lymph node dissection        Primary Care Provider Office Phone # Fax #    Mere Alvarado [phone]       [address] [address]        Equal Access to Services     SOO CONWAY : Hadii lina Rendon, waaxda luqadaha, qaybta kaalmada killian, kaleigh meadows. So [facility] [phone].    ATENCIÓN: Si habla español, tiene a espinal disposición servicios gratuitos de asistencia lingüística. Llame al [phone].    We comply with applicable federal civil rights laws and [place] laws. We do not discriminate on the basis of race, color, national origin, age, disability sex, sexual orientation or gender identity.            Thank you!     Thank you for choosing [facility]  for your care. Our goal is always to " provide you with excellent care. Hearing back from our patients is one way we can continue to improve our services. Please take a few minutes to complete the written survey that you may receive in the mail after your visit with us. Thank you!             Your Updated Medication List - Protect others around you: Learn how to safely use, store and throw away your medicines at www.disposemymeds.org.          This list is accurate as of: 6/28/17  5:54 PM.  Always use your most recent med list.                   Brand Name Dispense Instructions for use Diagnosis    lisinopril 20 MG tablet    PRINIVIL/ZESTRIL     Take 20 mg by mouth daily        loratadine-pseudoePHEDrine  MG per 24 hr tablet    CLARITIN-D 24-hour     Take 1 tablet by mouth        MULTI-VITAMINS Tabs      Take 1 tablet by mouth        order for DME     1 Device    Ankle brace    Ankle injury

## 2017-06-28 NOTE — PROGRESS NOTES
Consult Notes on Referred Patient    Date: 2017       Dr. Veronika Baker MD  [facility]  [address] [place], [place] [address] [facility] [address]  [place] [address]       RE: Fabián Phoenix  : 1970  SANTA: 2017    Dear Dr. Veronika Baker:    I had the pleasure of seeing your patient Fabián Phoenix here at the Gynecologic Cancer Clinic at the [facility] on 2017.  As you know she is a very pleasant 46 year old woman with a recent diagnosis of endometrial hyperplasia.  Given these findings she was subsequently sent to the Gynecologic Cancer Clinic for new patient consultation.   G0 fibroid uterus that is symptomatic, regular cycles. No B-symptoms. LEEP in  no abnormal pap smears since.    Past Medical History:    Past Medical History:   Diagnosis Date     Abnormal Pap smear     LEEP at [place]     Hashimoto's thyroiditis      Herpes simplex without mention of complication     rarely an issue     Hypertension 2015    controlled with Lisinopril     Thyroid disease 200x?    I have the thyroid antibody but levels remain normal.         Past Surgical History:    Past Surgical History:   Procedure Laterality Date     GYN SURGERY      LEEP         Health Maintenance:  Health Maintenance Due   Topic Date Due     TETANUS IMMUNIZATION (SYSTEM ASSIGNED)  10/31/1987     PAP SCREENING Q3 YR (SYSTEM ASSIGNED)  10/31/1990     LIPID SCREEN Q5 YR FEMALE (SYSTEM ASSIGNED)  10/31/2014           Current Medications:     has a current medication list which includes the following prescription(s): loratadine-pseudoephedrine, multi-vitamins, lisinopril, and order for dme.       Allergies:     [unfilled]        Social History:     Social History   Substance Use Topics     Smoking status: Former Smoker     Years: 17.00     Types: Cigarettes     Start date: 1984     Quit date: 2001     Smokeless tobacco: Not on file     Alcohol use Yes       History   Drug  "Use No           Family History:       Family History   Problem Relation Age of Onset     Hypertension Mother      Coronary Artery Disease Mother      Coronary Artery Disease Father      DIABETES Father      KIDNEY DISEASE Father      Hypertension Brother          Physical Exam:     /89  Pulse 72  Temp 98.1  F (36.7  C) (Oral)  Resp 17  Ht 1.695 m (5' 6.75\")  Wt 83.1 kg (183 lb 4.8 oz)  LMP 05/29/2017  SpO2 100%  BMI 28.92 kg/m2  Body mass index is 28.92 kg/(m^2).    General Appearance: healthy and alert, no distress        Gastrointestinal:       abdomen soft, non-tender, large uterus 20 week size    Genitourinary: External genitalia and urethral meatus appears normal.  Vagina is smooth without nodularity or masses.  Cervix appears normal and without lesions.  Bimanual exam reveals 20 week size uterus.  Recto-vaginal exam confirms these findings.      Assessment:    Fabián Phoenix is a 46 year old woman with a new diagnosis of endometrial hyperplasia.     A total of 60 minutes was spent with the patient, 40 minutes of which were spent in counseling the patient and/or treatment planning.    1. Endometrial hyperplasia  2. Uterine fibroids    We did discuss the risk of endometrial cancer is 43% and leiomysarcoma 0.1%. We will proceed with QUENTIN, BSO and staging/debulking if needed.    Risks, benefits and alternatives to proceed discussed in detail with the patient. Risks include but are not limited to bleeding, infection, possible injury to surrounding organs including bowel, bladder, ureter, need for second procedure/surgery related to complications from first procedure, postoperative medical complications such as cardiopulmonary events, lymphedema, lymphocyst, thromboembolic events. Consent for surgery, blood transfusion signed.  Will arrange appropriate preoperative blood work, CXR, EKG. Patient also advised on need for postoperative surveillance and/or adjuvant therapy. Questions " answered.      Thank you for allowing us to participate in the care of your patient.         Sincerely,    Byron Cook MD, MS    Department of Obstetrics and Gynecology   Division of Gynecologic Oncology   [facility]  Phone: [phone]      CC  Patient Care Team:  Mere Alvarado as PCP - General (Nurse Practitioner)  Yair Woods MD as MD (OB/Gyn)  YAIR WOODS

## 2017-06-28 NOTE — NURSING NOTE
Pre Op Nurse Teaching Template    Relevant Diagnosis: Endometrial hyperplasia    Teaching Topic: Exploratory laparotomy, hysterectomy, BSO, possible lymph node dissection, possible omentectomy, possible debulking    Person(s) involved in teaching :  Patient  &  Spouse    Motivation Level:  Asks Questions:    Yes      Eager to Learn:     Yes     Cooperative:          Yes    Receptive (willing. Able to accept information):    Yes      Patient and those who are listed above demonstrates understanding of the following:   Reason for the appointment, diagnosis and treatment plan:   Yes   Knowledge of proper use of medications and conditions for which they are ordered (with special attention to potential side effects or drug interactions): Yes   Which situations necessitate calling provider and whom to contact: Yes         Proper use and care of (medical equip, care aids, etc.):   N/A  Nutritional needs and diet plan:  Yes      Pain management techniques:     Yes, Pain Scale   Patient instructed on hand hygiene:  N/A  How and/when to access community resources:  Yes    Diet:   Yes, [facility] Diet Instructions      Teaching Concerns addressed: Yes      Infection Prevention:  Patient and those who are listed above demonstrates understanding of the following:  Surgical procedure site care taught:   Yes   Signs and symptoms of infection taught: Yes       Instructional Materials Used/Given:  [facility] Before Your Surgery Booklet  [facility] Skin Prep  Hysterectomy Guidelines  Home Care after Major Abdominal or Vaginal Surgery  Map  Accommodations Brochure  Phone numbers for [facility] and Station 7C Given to Patient      Comments:  NA       Time spent teaching with patient:  20 minutes  Elisabeth Orona RN

## 2017-06-28 NOTE — NURSING NOTE
"Oncology Rooming Note    June 28, 2017 3:07 PM   Fabián Phoenix is a 46 year old female who presents for:    Chief Complaint   Patient presents with     Oncology Clinic Visit     new patient consultation visit related to endometrial hyperplasia with atypia      Initial Vitals: /89  Pulse 72  Temp 98.1  F (36.7  C) (Oral)  Resp 17  Ht 1.695 m (5' 6.75\")  Wt 83.1 kg (183 lb 4.8 oz)  LMP 05/29/2017  SpO2 100%  BMI 28.92 kg/m2 Estimated body mass index is 28.92 kg/(m^2) as calculated from the following:    Height as of this encounter: 1.695 m (5' 6.75\").    Weight as of this encounter: 83.1 kg (183 lb 4.8 oz). Body surface area is 1.98 meters squared.  No Pain (0) Comment: Data Unavailable   Patient's last menstrual period was 05/29/2017.  Allergies reviewed: Yes  Medications reviewed: Yes    Medications: Medication refills not needed today.  Pharmacy name entered into [facility]: [id number] - [facility] [address]    Clinical concerns: abdomen pressure discomfort - patient takes ibuprofen PRN for relief dr. herrera was notified.    5 minutes for nursing intake (face to face time)     Lion Knott CMA              "

## 2017-06-28 NOTE — LETTER
2017       RE: Fabián Phoenix  [address] [facility] [place] [address]     Dear Colleague,    Thank you for referring your patient, Fabián Phoenix, to the [facility]. Please see a copy of my visit note below.                            Consult Notes on Referred Patient    Date: 2017       Dr. Veronika Baker MD  [facility]  [address]  [place] [address]       RE: Fabián Phoenix  : 1970  SANTA: 2017    Dear Dr. Veronika Baker:    I had the pleasure of seeing your patient Fabián Phoenix here at the Gynecologic Cancer Clinic at the [facility] on 2017.  As you know she is a very pleasant 46 year old woman with a recent diagnosis of endometrial hyperplasia.  Given these findings she was subsequently sent to the Gynecologic Cancer Clinic for new patient consultation.   G0 fibroid uterus that is symptomatic, regular cycles. No B-symptoms. LEEP in  no abnormal pap smears since.    Past Medical History:    Past Medical History:   Diagnosis Date     Abnormal Pap smear     LEEP at [facility]     Hashimoto's thyroiditis      Herpes simplex without mention of complication     rarely an issue     Hypertension 2015    controlled with Lisinopril     Thyroid disease 200x?    I have the thyroid antibody but levels remain normal.         Past Surgical History:    Past Surgical History:   Procedure Laterality Date     GYN SURGERY      LEEP         Health Maintenance:  Health Maintenance Due   Topic Date Due     TETANUS IMMUNIZATION (SYSTEM ASSIGNED)  10/31/1987     PAP SCREENING Q3 YR (SYSTEM ASSIGNED)  10/31/1990     LIPID SCREEN Q5 YR FEMALE (SYSTEM ASSIGNED)  10/31/2014           Current Medications:     has a current medication list which includes the following prescription(s): loratadine-pseudoephedrine, multi-vitamins, lisinopril, and order for dme.       Allergies:     [unfilled]        Social  "History:     Social History   Substance Use Topics     Smoking status: Former Smoker     Years: 17.00     Types: Cigarettes     Start date: 9/1/1984     Quit date: 6/1/2001     Smokeless tobacco: Not on file     Alcohol use Yes       History   Drug Use No           Family History:       Family History   Problem Relation Age of Onset     Hypertension Mother      Coronary Artery Disease Mother      Coronary Artery Disease Father      DIABETES Father      KIDNEY DISEASE Father      Hypertension Brother          Physical Exam:     /89  Pulse 72  Temp 98.1  F (36.7  C) (Oral)  Resp 17  Ht 1.695 m (5' 6.75\")  Wt 83.1 kg (183 lb 4.8 oz)  LMP 05/29/2017  SpO2 100%  BMI 28.92 kg/m2  Body mass index is 28.92 kg/(m^2).    General Appearance: healthy and alert, no distress        Gastrointestinal:       abdomen soft, non-tender, large uterus 20 week size    Genitourinary: External genitalia and urethral meatus appears normal.  Vagina is smooth without nodularity or masses.  Cervix appears normal and without lesions.  Bimanual exam reveals 20 week size uterus.  Recto-vaginal exam confirms these findings.      Assessment:    Fabián Phoenix is a 46 year old woman with a new diagnosis of endometrial hyperplasia.     A total of 60 minutes was spent with the patient, 40 minutes of which were spent in counseling the patient and/or treatment planning.    1. Endometrial hyperplasia  2. Uterine fibroids    We did discuss the risk of endometrial cancer is 43% and leiomysarcoma 0.1%. We will proceed with QUENTIN, BSO and staging/debulking if needed.    Risks, benefits and alternatives to proceed discussed in detail with the patient. Risks include but are not limited to bleeding, infection, possible injury to surrounding organs including bowel, bladder, ureter, need for second procedure/surgery related to complications from first procedure, postoperative medical complications such as cardiopulmonary events, lymphedema, " lymphocyst, thromboembolic events. Consent for surgery, blood transfusion signed.  Will arrange appropriate preoperative blood work, CXR, EKG. Patient also advised on need for postoperative surveillance and/or adjuvant therapy. Questions answered.      Thank you for allowing us to participate in the care of your patient.         Sincerely,    Byron Cook MD, MS    Department of Obstetrics and Gynecology   Division of Gynecologic Oncology   [facility]  Phone: [phone]      CC  Patient Care Team:  Mere Alvarado as PCP - General (Nurse Practitioner)  Veronika Baker MD as MD (OB/Gyn)

## 2017-06-29 ENCOUNTER — ANESTHESIA EVENT (OUTPATIENT)
Dept: SURGERY | Facility: CLINIC | Age: 47
DRG: 743 | End: 2017-06-29
Payer: COMMERCIAL

## 2017-06-30 ENCOUNTER — OFFICE VISIT (OUTPATIENT)
Dept: SURGERY | Facility: CLINIC | Age: 47
End: 2017-06-30

## 2017-06-30 ENCOUNTER — ALLIED HEALTH/NURSE VISIT (OUTPATIENT)
Dept: SURGERY | Facility: CLINIC | Age: 47
End: 2017-06-30

## 2017-06-30 VITALS
SYSTOLIC BLOOD PRESSURE: 129 MMHG | BODY MASS INDEX: 28.5 KG/M2 | TEMPERATURE: 97.9 F | RESPIRATION RATE: 14 BRPM | DIASTOLIC BLOOD PRESSURE: 85 MMHG | OXYGEN SATURATION: 100 % | HEART RATE: 61 BPM | WEIGHT: 181.6 LBS | HEIGHT: 67 IN

## 2017-06-30 DIAGNOSIS — B00.9 HERPES SIMPLEX VIRUS (HSV) INFECTION: ICD-10-CM

## 2017-06-30 DIAGNOSIS — Z01.818 PREOP EXAMINATION: Primary | ICD-10-CM

## 2017-06-30 DIAGNOSIS — N85.02 ENDOMETRIAL HYPERPLASIA WITH ATYPIA: ICD-10-CM

## 2017-06-30 DIAGNOSIS — Z01.818 PREOP EXAMINATION: ICD-10-CM

## 2017-06-30 LAB
ALBUMIN SERPL-MCNC: 4.2 G/DL (ref 3.4–5)
ALP SERPL-CCNC: 49 U/L (ref 40–150)
ALT SERPL W P-5'-P-CCNC: 28 U/L (ref 0–50)
ANION GAP SERPL CALCULATED.3IONS-SCNC: 7 MMOL/L (ref 3–14)
AST SERPL W P-5'-P-CCNC: 17 U/L (ref 0–45)
BASOPHILS # BLD AUTO: 0 10E9/L (ref 0–0.2)
BASOPHILS NFR BLD AUTO: 0.4 %
BILIRUB SERPL-MCNC: 0.4 MG/DL (ref 0.2–1.3)
BUN SERPL-MCNC: 16 MG/DL (ref 7–30)
CALCIUM SERPL-MCNC: 8.6 MG/DL (ref 8.5–10.1)
CHLORIDE SERPL-SCNC: 103 MMOL/L (ref 94–109)
CO2 SERPL-SCNC: 27 MMOL/L (ref 20–32)
CREAT SERPL-MCNC: 0.69 MG/DL (ref 0.52–1.04)
DIFFERENTIAL METHOD BLD: NORMAL
EOSINOPHIL # BLD AUTO: 0 10E9/L (ref 0–0.7)
EOSINOPHIL NFR BLD AUTO: 0.6 %
ERYTHROCYTE [DISTWIDTH] IN BLOOD BY AUTOMATED COUNT: 11.8 % (ref 10–15)
GFR SERPL CREATININE-BSD FRML MDRD: NORMAL ML/MIN/1.7M2
GLUCOSE SERPL-MCNC: 85 MG/DL (ref 70–99)
HCT VFR BLD AUTO: 36.4 % (ref 35–47)
HGB BLD-MCNC: 12.2 G/DL (ref 11.7–15.7)
IMM GRANULOCYTES # BLD: 0 10E9/L (ref 0–0.4)
IMM GRANULOCYTES NFR BLD: 0.2 %
LYMPHOCYTES # BLD AUTO: 1.3 10E9/L (ref 0.8–5.3)
LYMPHOCYTES NFR BLD AUTO: 26.7 %
MCH RBC QN AUTO: 29.2 PG (ref 26.5–33)
MCHC RBC AUTO-ENTMCNC: 33.5 G/DL (ref 31.5–36.5)
MCV RBC AUTO: 87 FL (ref 78–100)
MONOCYTES # BLD AUTO: 0.3 10E9/L (ref 0–1.3)
MONOCYTES NFR BLD AUTO: 6.2 %
NEUTROPHILS # BLD AUTO: 3.1 10E9/L (ref 1.6–8.3)
NEUTROPHILS NFR BLD AUTO: 65.9 %
NRBC # BLD AUTO: 0 10*3/UL
NRBC BLD AUTO-RTO: 0 /100
PLATELET # BLD AUTO: 231 10E9/L (ref 150–450)
POTASSIUM SERPL-SCNC: 3.9 MMOL/L (ref 3.4–5.3)
PROT SERPL-MCNC: 7.4 G/DL (ref 6.8–8.8)
RBC # BLD AUTO: 4.18 10E12/L (ref 3.8–5.2)
SODIUM SERPL-SCNC: 138 MMOL/L (ref 133–144)
WBC # BLD AUTO: 4.7 10E9/L (ref 4–11)

## 2017-06-30 ASSESSMENT — LIFESTYLE VARIABLES: TOBACCO_USE: 1

## 2017-06-30 NOTE — ANESTHESIA PREPROCEDURE EVALUATION
Anesthesia Evaluation     . Pt has had prior anesthetic. Type: General    History of anesthetic complications   - PONV        ROS/MED HX    ENT/Pulmonary:     (+)allergic rhinitis, tobacco use, Past use , . .    Neurologic:  - neg neurologic ROS     Cardiovascular:     (+) hypertension-range: 120/80s, ---. : . . . :. .      (-) taking anticoagulants/antiplatelets and RODRIGUEZ   METS/Exercise Tolerance:  >4 METS   Hematologic:  - neg hematologic  ROS       Musculoskeletal:  - neg musculoskeletal ROS       GI/Hepatic:  - neg GI/hepatic ROS       Renal/Genitourinary:  - ROS Renal section negative       Endo:     (+) thyroid problem  Thyroid disease - Other Has a thyroid antibody, does not need treatment, .      Psychiatric: Comment: Situational anxiety regarding upcoming surgery. - neg psychiatric ROS       Infectious Disease:  - neg infectious disease ROS       Malignancy:   (+) History of Other       - no malignancy   Other:    - neg other ROS                 Physical Exam  Normal systems: dental    Airway   Mallampati: I  TM distance: >3 FB  Neck ROM: full    Dental     Cardiovascular   Rhythm and rate: regular and normal  (-) no peripheral edema and no murmur    Pulmonary    breath sounds clear to auscultation    Other findings: 6/30/17: WBC 4.7; Hgb 12.2; Hct 36.4; Plt 231  6/30/17: Na 138; K 3.9; Cl 103; Glu 85; BUN 16; Cr 0.69; Ca 8.6; Total bili 0.4; Albumin 4.2; Alk phos 49; ALT 28; AST 17    5/5/17 CT Abdomen Pelvis:   IMPRESSION:   Multicystic appearance of the left ovary and markedly myomatous large uterus. Prominent endometrium, although not well seen. Normal right ovary. Recommend further evaluation by ultrasound and/or pelvic MRI.   No other findings to explain the patient's symptoms. No other acute or suspicious findings.            PAC Discussion and Assessment    ASA Classification: 2  Case is suitable for: [place]  Anesthetic techniques and relevant risks discussed: GA  Invasive monitoring and risk  discussed: No  Types:   Possibility and Risk of blood transfusion discussed: Yes  NPO instructions given:   Additional anesthetic preparation and risks discussed:   Needs early admission to pre-op area:   Other:     PAC Resident/NP Anesthesia Assessment:  Fabián Phoenix is a 46 year old female scheduled to undergo Open Total Abdominal Hysterectomy, Bilateral Salpingo-Oophorectomy, Possible Lymph Node Dissection, Possible Omentectomy on 7/7/17 with Dr. Byron Cook.    She has the following specific operative considerations:   1.  History of postoperative nausea/vomiting: Recommend use of antiemetic agents in the perioperative period.    2.  HTN: Patient instructed to hold Lisinopril the AM of surgery for renal protection.    3.  Type & screen today, in addition to the labs ordered by Dr. Cook.    Revised Cardiac Risk Index: 0.9% risk of major adverse cardiac event.  VTE risk: 0.26%  CHAPITO risk: Low  PONV risk score= 3.  (If > 2, anti-emetic intervention is recommended.)  Anesthesia considerations: Refer to PAC assessment in the anesthesia records.      Reviewed and Signed by PAC Mid-Level Provider/Resident  Mid-Level Provider/Resident: aKia Michael CNP  Date: 6/30/17  Time: 2010    Attending Anesthesiologist Anesthesia Assessment:  I have examined the patient and reviewed the chart.  I have discussed the patient with the WILTON and concur with her assessment.  The patient is athletic and healthy.  Her only co morbidity is HTN.  She has had only one previous surgery (LEEP) with no complications.    MPC 1, Lungs clear  CVS  RRR with no murmur    Discussed GA , possible pre procedure block, possible transfusion  Pt verbalizes understanding and agrees to all  No further testing required.        Reviewed and Signed by PAC Anesthesiologist  Anesthesiologist: Reno Silverman MD  Date: 06/30/2017  Time:   Pass/Fail:   Disposition:     PAC Pharmacist Assessment:        Pharmacist:   Date:    Time:      Anesthesia Plan      History & Physical Review  History and physical reviewed and following examination; no interval change.    ASA Status:  3 .    NPO Status:  > 8 hours    Plan for General          Postoperative Care      Consents        Anesthesia attending addendum    Prior to anesthetic I have interviewed and examined the patient, reviewed the past medical history, lab results and other pertinent findings, and discussed the ssessment and plan with the anesthesia and surgery teams.  46 year old female who  has a past medical history of Abnormal Pap smear (2002); Endometrial hyperplasia with atypia; Hashimoto's thyroiditis; Hypertension (2015); PONV (postoperative nausea and vomiting); Thyroid disease (200x?); and Uterine leiomyoma, unspecified location (6/8/2017). to OR today for Procedure(s):  Open Total Abdominal Hysterectomy, Bilateral Salpingo-Oopherectomy, Possible Lymph Node Dissection, Possible Omentectomy Anesthesia Block - Wound Class: II-Clean Contaminated  NPO status adequate.  No results found for: HCGQUANT  Hemoglobin   Date Value Ref Range Status   06/30/2017 12.2 11.7 - 15.7 g/dL Final     Potassium   Date Value Ref Range Status   06/30/2017 3.9 3.4 - 5.3 mmol/L Final       Plan for GA/ETT, standard monitors, large bore IVs, possible blood transfusion, possible postoperative ICU.  PONV prophylaxis.  Antibiotics per surgery.  Anesthetic risks discussed with the patient, consent signed.    Marilou Sams MD  Anesthesiology Attending  07/07/17                  .

## 2017-06-30 NOTE — H&P
Pre-Operative H & P     Date of Encounter: 6/30/2017  Primary Care Physician:  Mere Alvarado    CC: Uterine hyperplasia with atypia, history of uterine fibroids.    HPI:  Fabián Phoenix is a 46 year old female who presents for pre-operative H & P in preparation for Open Total Abdominal Hysterectomy, Bilateral Salpingo-Oophorectomy, Possible Lymph Node Dissection, Possible Omentectomy on 7/7/17 with Dr. Byron Cook at [facility].     The patient presented to Dr. Baker with OB/GYN in regards to worsening symptoms of bloating, pelvic pressure, and constipation. The patient has a history of uterine fibroids and infertility, and felt that her symptoms were related to growth of the fibroids. An endometrial biopsy and ultrasound were performed. When the pathology revealed hyperplasia with atypia, the patient was referred to [facility] for further evaluation and treatment. She was evaluated by Dr. Cook on 6/28, and treatment options were discussed. Arrangements are now being made for the above procedures.   History is obtained from the patient and the medical record.    Past Medical History:  Past Medical History:   Diagnosis Date     Abnormal Pap smear 2002    LEEP at [place]     Endometrial hyperplasia with atypia      Hashimoto's thyroiditis      Hypertension 2015    controlled with Lisinopril     Thyroid disease 200x?    I have the thyroid antibody but levels remain normal.     Uterine leiomyoma, unspecified location 6/8/2017     Past Surgical History:  Past Surgical History:   Procedure Laterality Date     GYN SURGERY  2002    LEEP     Hx of Blood transfusions/reactions: Denies.     Hx of abnormal bleeding or anti-platelet use: Denies.    Menstrual history: Patient's last menstrual period was 05/29/2017.    Steroid use in the last year: Denies.    Personal or FH of difficulty with anesthesia: Denies.    Prior to admission medications  Current  Outpatient Prescriptions   Medication Sig Dispense Refill     loratadine-pseudoePHEDrine (CLARITIN-D 24-HOUR)  MG per 24 hr tablet Take 1 tablet by mouth Stopped 6/28/17 for her surgery per her        Multiple Vitamin (MULTI-VITAMINS) TABS Take 1 tablet by mouth Stopped 6/28/17 for her surgery per her        lisinopril (PRINIVIL/ZESTRIL) 20 MG tablet Take 20 mg by mouth every morning        ORDER FOR DME Ankle brace   1 Device 0     Allergies  Allergies   Allergen Reactions     Sulfa Drugs Rash     Social History  Social History     Social History     Marital status:      Spouse name: N/A     Number of children: N/A     Years of education: N/A     Occupational History     Not on file.     Social History Main Topics     Smoking status: Former Smoker     Years: 17.00     Types: Cigarettes     Start date: 9/1/1984     Quit date: 6/1/2001     Smokeless tobacco: Not on file     Alcohol use Yes     Drug use: No     Sexual activity: Yes     Partners: Male     Birth control/ protection: None     Other Topics Concern     Not on file     Social History Narrative     Family History  Family History   Problem Relation Age of Onset     Hypertension Mother      Coronary Artery Disease Mother      Coronary Artery Disease Father      DIABETES Father      KIDNEY DISEASE Father      Hypertension Brother      Review of Systems  Functional status: Independent in ADL's.  >4 METS.     The complete review of systems is negative other than noted in the HPI or here.   Constitutional: Denies recent changes in sleeping patterns, or fevers/chills.  Reports an approximately 10 pound weight gain since January.  Eyes: Glasses for vision correction.  No recent vision changes.  EENT: Denies recent changes in hearing, mouth pain, or difficulty swallowing.  Cardiovascular: Denies chest pain, RODRIGUEZ or orthopnea, or palpitations.  Respiratory: Denies shortness of breath or significant cough.    GI: Denies nausea/vomiting or diarrhea.   "Reports a sense of fullness in her abdomen, and a tendency for constipation.      : Denies dysuria.  Reports urge incontinence, at times.  Thought to be related to her enlarged uterus.      Musculoskeletal: Denies joint pain or swelling.    Skin: Denies rashes or wounds.    Hematologic: Denies easy bruising or bleeding.    Neurologic: Denies migraines, seizures, dizziness, numbness/tingling.  Psychiatric: Denies changes in mood or affect.      /85  Pulse 61  Temp 97.9  F (36.6  C) (Oral)  Resp 14  Ht 1.702 m (5' 7\")  Wt 82.4 kg (181 lb 9.6 oz)  LMP 05/29/2017  SpO2 100%  BMI 28.44 kg/m2    181 lbs 9.6 oz  5' 7\"   Body mass index is 28.44 kg/(m^2).    Physical Exam  Constitutional: Patient awake, seated upright in a chair, in no apparent distress.  Appears stated age.  Eyes: Pupils round and reactive to light.  Left pupil is slightly larger than the right.  Extra ocular muscles intact. Sclera clear.  Conjunctiva normal.  HENT: Head normocephalic.  Oral pharynx intact with moist mucous membranes.  Dentition intact.  No thyromegaly appreciated.   Respiratory: Lung sounds clear to auscultation bilaterally.  No rales, rhonchi, or wheezing noted.    Cardiovascular: S1, S2, regular rate and rhythm.  No murmurs, rubs, or gallops noted. Radial and pedal pulses palpable, bilaterally.  No edema noted.   GI: Bowel sounds present.  Abdomen rounded, soft.  Slight tenderness elicited with light palpation over the LLQ.  No hepatosplenomegaly or masses palpated.   Genitourinary: Exam deferred.  Lymph/Hematologic: No cervical or supraclavicular lymphadenopathy noted.  No excessive bruising noted.    Skin: Color appropriate for race, warm, dry.  No rashes or wounds at anticipated surgical site.   Musculoskeletal: Full extension of the neck.  No redness, warmth, or swelling of the joints noted. Gross motor strength is normal.    Neurologic: Alert, oriented to name, place and time. Cranial nerves II-XII are grossly " intact. Gait is normal.      Neuropsychiatric: Calm, cooperative. Normal affect.     Labs:  17: WBC 4.7; Hgb 12.2; Hct 36.4; Plt 231  17: Na 138; K 3.9; Cl 103; Glu 85; BUN 16; Cr 0.69; Ca 8.6; Total bili 0.4; Albumin 4.2; Alk phos 49; ALT 28; AST 17    Imagin17 CT Abdomen Pelvis:   IMPRESSION:   Multicystic appearance of the left ovary and markedly myomatous large uterus. Prominent endometrium, although not well seen. Normal right ovary. Recommend further evaluation by ultrasound and/or pelvic MRI.   No other findings to explain the patient's symptoms. No other acute or suspicious findings.     Lab results were personally reviewed by this provider.      Assessment and Plan  Fabián Phoenix is a 46 year old female scheduled to undergo Open Total Abdominal Hysterectomy, Bilateral Salpingo-Oophorectomy, Possible Lymph Node Dissection, Possible Omentectomy on 17 with Dr. Byron Cook.    She has the following specific operative considerations:   1.  History of postoperative nausea/vomiting: Recommend use of antiemetic agents in the perioperative period.    2.  HTN: Patient instructed to hold Lisinopril the AM of surgery for renal protection.    3.  Type & screen today, in addition to the labs ordered by Dr. Cook.    Revised Cardiac Risk Index: 0.9% risk of major adverse cardiac event.  VTE risk: 0.26%  CHAPITO risk: Low  PONV risk score= 3.  (If > 2, anti-emetic intervention is recommended.)  Anesthesia considerations: Refer to PAC assessment in the anesthesia records.    Patient was discussed with Dr. Silverman.    Kaia Michael NP  Preoperative Assessment Center  [facility]  Phone: [phone]  Fax: [phone]

## 2017-06-30 NOTE — OR NURSING
MRAPT Score   Home: Pt. Agrees she will go home post hospital discharge.  supportive and available for assistance once home. Friends and family  Also available to assist as needed.Pt agrees with plan of care-Kelsey Hyde RN

## 2017-06-30 NOTE — MR AVS SNAPSHOT
After Visit Summary   2017    Fabián Phoenix    MRN: [id number]           Patient Information     Date Of Birth          1970        Visit Information        Provider Department      2017 5:30 PM Rn, [facility] Preoperative Assessment [facility]        Care Instructions    Hospital Visiting Restrictions:   Due to the current measles outbreak, visitor restrictions are in place in the hospitals. No visitors under 5 are allowed to visit. Also, visitors who are unvaccinated for measles and those who are currently ill are also asked to refrain from visiting.    Preparing for Your Surgery      Name:  Fabián Phoenix   MRN:  [id number]   :  1970   Today's Date:  2017     Arriving for surgery:  Surgery date:  17  Surgery time:  1115 AM  Arrival time:  0915 AM  Please come to:       [facility] [address]  [address] [place]  [address]    -   parking is available in front of the hospital from 5:15 am to 8:00 pm    -  Stop at the Information Desk in the lobby    -   Inform the information person that you are here for surgery. An escort to 3c will be provided. If you would not like an escort, please proceed to 3C on the 3rd floor. [phone]     What can I eat or drink?  -  You may have solid food or milk products until 8 hours prior to your surgery. 12  halima.  -  You may have water, apple juice or 7up/Sprite until 2 hours prior to your surgery. 915 AM    Which medicines can I take?  -  Do NOT take these medications in the morning, the day of surgery:  Continue to hold supplements. HOLD Lisinopril AM of surgery.    -  Please take these medications the day of surgery:  Scheduled meds.    How do I prepare myself?  -  Take two showers: one the night before surgery; and one the morning of surgery.         Use Scrubcare or Hibiclens to wash from neck down.  You may use your own shampoo and  conditioner. No other hair products.   -  Do NOT use lotion, powder, deodorant, or antiperspirant the day of your surgery.  -  Do NOT wear any makeup, fingernail polish or jewelry.  -  Begin using Incentive Spirometer 1 week prior to surgery.  Use 4 times per day, up to 5-10 breaths each time.  Bring Incentive Spirometer to hospital.  -Do not bring your own medications to the hospital, except for inhalers and eye drops.  -  Bring your ID and insurance card.    Questions or Concerns:  If you have questions or concerns, please call the  Preoperative Assessment Center, Monday-Friday 7AM-7PM:  [phone]    AFTER YOUR SURGERY  Breathing exercises   Breathing exercises help you recover faster. Take deep breaths and let the air out slowly. This will:     Help you wake up after surgery.    Help prevent complications like pneumonia.  Preventing complications will help you go home sooner.   We may give you a breathing device (incentive spirometer) to encourage you to breathe deeply.   Nausea and vomiting   You may feel sick to your stomach after surgery; if so, let your nurse know.    Pain control:  After surgery, you may have pain. Our goal is to help you manage your pain. Pain medicine will help you feel comfortable enough to do activities that will help you heal.  These activities may include breathing exercises, walking and physical therapy.   To help your health care team treat your pain we will ask: 1) If you have pain  2) where it is located 3) describe your pain in your words  Methods of pain control include medications given by mouth, vein or by nerve block for some surgeries.  We may give you a pain control pump that will:  1) Deliver the medicine through a tube placed in your vein  2) Control the amount of medicine you receive  3) Allow you to push a button to deliver a dose of pain medicine  Sequential Compression Device (SCD) or Pneumo Boots:  You may need to wear SCD S on your legs or feet. These are wraps  connected to a machine that pumps in air and releases it. The repeated pumping helps prevent blood clots from forming.       Using an Incentive Spirometer  Soon after your surgery, a nurse or therapist will teach you breathing exercises. These keep your lungs clear, strengthen your breathing muscles, and help prevent complications.  The exercises include doing a deep-breathing exercise using a device called an incentive spirometer.  To do these exercises, you will breathe in through your mouth and not your nose. The incentive spirometer only works correctly if you breathe in through your mouth.     Deep breathing expands the lungs, aids circulation, and helps prevent pneumonia.   Steps to clear lungs  Step 1. Exhale normally.    Relax and breathe out.  Step 2. Place your lips tightly around the mouthpiece.    Make sure the device is upright and not tilted.  Step 3. Inhale as much air as you can through the mouthpiece (don't breath through your nose).    Inhale slowly and deeply.    Hold your breath long enough to keep the balls or disk raised for at least 3 seconds.    Some spirometers have an indicator to let you know that you are breathing in too fast. If the indicator goes off, breathe in more slowly.  Step 4. Repeat the exercise regularly.    Do this exercise every hour while you're awake, or as instructed by your healthcare provider.    You will also be taught deep breathing and coughing exercises and be asked to do them regularly on your own.  Date Last Reviewed: 1/1/2017 2000-2017 [employer]. [address] [place] [address], [place] [address]. All rights reserved. This information is not intended as a substitute for professional medical care. Always follow your healthcare professional's instructions.                          Follow-ups after your visit        Your next 10 appointments already scheduled     Jun 30, 2017  4:50 PM CDT   (Arrive by 4:35 PM)   PAC Anesthesia Consult with  Pac  Anesthesiologist   [facility] Preoperative Assessment Center ([facility])    [address]  [address] [facility] [id number] [phone]            Jun 30, 2017  5:30 PM CDT   (Arrive by 5:15 PM)   PAC RN ASSESSMENT with Rey Pac Rn   [facility] Preoperative Assessment [facility] ([facility])    [address] [facility]  [address] [facility] [id number] [phone]            Jun 30, 2017  6:00 PM CDT   LAB with REY LAB   [facility] ([facility])    [address] [place] [address]  [address]st [facility] [id number] [phone]           Patient must bring picture ID.  Patient should be prepared to give a urine specimen  Please do not eat 10-12 hours before your appointment if you are coming in fasting for labs on lipids, cholesterol, or glucose (sugar).  Pregnant women should follow their Care Team instructions. Water with medications is okay. Do not drink coffee or other fluids.   If you have concerns about taking  your medications, please ask at office or if scheduling via LATTO, send a message by clicking on Secure Messaging, Message Your Care Team.            Jul 07, 2017   Procedure with Byron Cook MD   [facility] [place], Same Day Surgery (--)    [address]  [place] [address] [phone]            Jul 31, 2017  1:40 PM CDT   (Arrive by 1:25 PM)   Post-Op with Byron Cook MD   [facility] ([facility])    [address] [place] [address]  [address]nd [facility] [id number] [phone]              Future tests that were ordered for you today     Open Future Orders        Priority Expected Expires Ordered    ABO/Rh type and screen Routine 6/30/2017 7/30/2017 6/30/2017            Who to contact     Please call your clinic at [phone] to:    Ask questions about your health    Make or cancel appointments    Discuss your medicines    Learn about your test results    Speak to  your doctor   If you have compliments or concerns about an experience at your clinic, or if you wish to file a complaint, please contact [facility] Patient Relations at [phone] or email us at [email or web address]         Additional Information About Your Visit        MyChart Information     [employer] gives you secure access to your electronic health record. If you see a primary care provider, you can also send messages to your care team and make appointments. If you have questions, please call your primary care clinic.  If you do not have a primary care provider, please call [phone] and they will assist you.      [email or web address] is an electronic gateway that provides easy, online access to your medical records. With [email or web address], you can request a clinic appointment, read your test results, renew a prescription or communicate with your care team.     To access your existing account, please contact your [facility] or call [phone] for assistance.        Care EveryWhere ID     This is your Care EveryWhere ID. This could be used by other organizations to access your [facility] medical records  [id number]        Your Vitals Were     Last Period                   06/25/2017            Blood Pressure from Last 3 Encounters:   06/30/17 129/85   06/28/17 155/89   06/08/17 143/86    Weight from Last 3 Encounters:   06/30/17 82.4 kg (181 lb 9.6 oz)   06/28/17 83.1 kg (183 lb 4.8 oz)   06/08/17 82.6 kg (182 lb)              Today, you had the following     No orders found for display       Primary Care Provider Office Phone # Fax #    Mere Christiano [phone]       [address] [address]        Equal Access to Services     SOO CONWAY : brit Goode, kaleigh gallardo. So [facility] [phone].    ATENCIÓN: Si tony wetzel,  tiene a espinal disposición servicios gratuitos de asistencia lingüística. Anjali power [phone].    We comply with applicable federal civil rights laws and [place] laws. We do not discriminate on the basis of race, color, national origin, age, disability sex, sexual orientation or gender identity.            Thank you!     Thank you for choosing [facility] PREOPERATIVE [facility]  for your care. Our goal is always to provide you with excellent care. Hearing back from our patients is one way we can continue to improve our services. Please take a few minutes to complete the written survey that you may receive in the mail after your visit with us. Thank you!             Your Updated Medication List - Protect others around you: Learn how to safely use, store and throw away your medicines at www.disposemymeds.org.          This list is accurate as of: 6/30/17  4:45 PM.  Always use your most recent med list.                   Brand Name Dispense Instructions for use Diagnosis    IBUPROFEN PO      Take 400 mg by mouth daily as needed for moderate pain        lisinopril 20 MG tablet    PRINIVIL/ZESTRIL     Take 20 mg by mouth every morning        loratadine-pseudoePHEDrine  MG per 24 hr tablet    CLARITIN-D 24-hour     Take 1 tablet by mouth Stopped 6/28/17 for her surgery per her         MULTI-VITAMINS Tabs      Take 1 tablet by mouth Stopped 6/28/17 for her surgery per her

## 2017-06-30 NOTE — PATIENT INSTRUCTIONS
Hospital Visiting Restrictions:   Due to the current measles outbreak, visitor restrictions are in place in the hospitals. No visitors under 5 are allowed to visit. Also, visitors who are unvaccinated for measles and those who are currently ill are also asked to refrain from visiting.    Preparing for Your Surgery      Name:  Fabián Phoenix   MRN:  [id number]   :  1970   Today's Date:  2017     Arriving for surgery:  Surgery date:  17  Surgery time:  1115 AM  Arrival time:  0915 AM  Please come to:       [facility] [address]  [address] [place]  [address]    -   parking is available in front of the hospital from 5:15 am to 8:00 pm    -  Stop at the Information Desk in the lobby    -   Inform the information person that you are here for surgery. An escort to 3c will be provided. If you would not like an escort, please proceed to 3C on the 3rd floor. [phone]     What can I eat or drink?  -  You may have solid food or milk products until 8 hours prior to your surgery. 12  halima.  -  You may have water, apple juice or 7up/Sprite until 2 hours prior to your surgery. 915 AM    Which medicines can I take?  -  Do NOT take these medications in the morning, the day of surgery:  Continue to hold supplements. HOLD Lisinopril AM of surgery.    -  Please take these medications the day of surgery:  Scheduled meds.    How do I prepare myself?  -  Take two showers: one the night before surgery; and one the morning of surgery.         Use Scrubcare or Hibiclens to wash from neck down.  You may use your own shampoo and conditioner. No other hair products.   -  Do NOT use lotion, powder, deodorant, or antiperspirant the day of your surgery.  -  Do NOT wear any makeup, fingernail polish or jewelry.  -  Begin using Incentive Spirometer 1 week prior to surgery.  Use 4 times per day, up to 5-10 breaths each time.  Bring Incentive  Spirometer to hospital.  -Do not bring your own medications to the hospital, except for inhalers and eye drops.  -  Bring your ID and insurance card.    Questions or Concerns:  If you have questions or concerns, please call the  Preoperative Assessment Center, Monday-Friday 7AM-7PM:  [phone]    AFTER YOUR SURGERY  Breathing exercises   Breathing exercises help you recover faster. Take deep breaths and let the air out slowly. This will:     Help you wake up after surgery.    Help prevent complications like pneumonia.  Preventing complications will help you go home sooner.   We may give you a breathing device (incentive spirometer) to encourage you to breathe deeply.   Nausea and vomiting   You may feel sick to your stomach after surgery; if so, let your nurse know.    Pain control:  After surgery, you may have pain. Our goal is to help you manage your pain. Pain medicine will help you feel comfortable enough to do activities that will help you heal.  These activities may include breathing exercises, walking and physical therapy.   To help your health care team treat your pain we will ask: 1) If you have pain  2) where it is located 3) describe your pain in your words  Methods of pain control include medications given by mouth, vein or by nerve block for some surgeries.  We may give you a pain control pump that will:  1) Deliver the medicine through a tube placed in your vein  2) Control the amount of medicine you receive  3) Allow you to push a button to deliver a dose of pain medicine  Sequential Compression Device (SCD) or Pneumo Boots:  You may need to wear SCD S on your legs or feet. These are wraps connected to a machine that pumps in air and releases it. The repeated pumping helps prevent blood clots from forming.       Using an Incentive Spirometer  Soon after your surgery, a nurse or therapist will teach you breathing exercises. These keep your lungs clear, strengthen your breathing muscles, and help  prevent complications.  The exercises include doing a deep-breathing exercise using a device called an incentive spirometer.  To do these exercises, you will breathe in through your mouth and not your nose. The incentive spirometer only works correctly if you breathe in through your mouth.     Deep breathing expands the lungs, aids circulation, and helps prevent pneumonia.   Steps to clear lungs  Step 1. Exhale normally.    Relax and breathe out.  Step 2. Place your lips tightly around the mouthpiece.    Make sure the device is upright and not tilted.  Step 3. Inhale as much air as you can through the mouthpiece (don't breath through your nose).    Inhale slowly and deeply.    Hold your breath long enough to keep the balls or disk raised for at least 3 seconds.    Some spirometers have an indicator to let you know that you are breathing in too fast. If the indicator goes off, breathe in more slowly.  Step 4. Repeat the exercise regularly.    Do this exercise every hour while you're awake, or as instructed by your healthcare provider.    You will also be taught deep breathing and coughing exercises and be asked to do them regularly on your own.  Date Last Reviewed: 1/1/2017 2000-2017 [employer]. [address] [place] [address], [place] [address]. All rights reserved. This information is not intended as a substitute for professional medical care. Always follow your healthcare professional's instructions.

## 2017-07-07 ENCOUNTER — ANESTHESIA (OUTPATIENT)
Dept: SURGERY | Facility: CLINIC | Age: 47
DRG: 743 | End: 2017-07-07
Payer: COMMERCIAL

## 2017-07-07 ENCOUNTER — SURGERY (OUTPATIENT)
Age: 47
End: 2017-07-07
Payer: COMMERCIAL

## 2017-07-07 ENCOUNTER — HOSPITAL ENCOUNTER (INPATIENT)
Facility: CLINIC | Age: 47
LOS: 2 days | Discharge: HOME | DRG: 743 | End: 2017-07-09
Attending: OBSTETRICS & GYNECOLOGY | Admitting: OBSTETRICS & GYNECOLOGY
Payer: COMMERCIAL

## 2017-07-07 DIAGNOSIS — Z90.710 S/P ABDOMINAL HYSTERECTOMY: Primary | ICD-10-CM

## 2017-07-07 DIAGNOSIS — G89.18 ACUTE POST-OPERATIVE PAIN: ICD-10-CM

## 2017-07-07 LAB
ABO + RH BLD: NORMAL
ABO + RH BLD: NORMAL
BLD GP AB SCN SERPL QL: NORMAL
BLOOD BANK CMNT PATIENT-IMP: NORMAL
BLOOD BANK CMNT PATIENT-IMP: NORMAL
HCG UR QL: NEGATIVE
SPECIMEN EXP DATE BLD: NORMAL

## 2017-07-07 PROCEDURE — C9399 UNCLASSIFIED DRUGS OR BIOLOG: HCPCS | Performed by: NURSE ANESTHETIST, CERTIFIED REGISTERED

## 2017-07-07 PROCEDURE — 88331 PATH CONSLTJ SURG 1 BLK 1SPC: CPT | Performed by: OBSTETRICS & GYNECOLOGY

## 2017-07-07 PROCEDURE — 25000128 H RX IP 250 OP 636: Performed by: NURSE ANESTHETIST, CERTIFIED REGISTERED

## 2017-07-07 PROCEDURE — 25000125 ZZHC RX 250: Performed by: ANESTHESIOLOGY

## 2017-07-07 PROCEDURE — 58150 TOTAL HYSTERECTOMY: CPT | Mod: GC | Performed by: OBSTETRICS & GYNECOLOGY

## 2017-07-07 PROCEDURE — 25000128 H RX IP 250 OP 636: Performed by: ANESTHESIOLOGY

## 2017-07-07 PROCEDURE — 71000014 ZZH RECOVERY PHASE 1 LEVEL 2 FIRST HR: Performed by: OBSTETRICS & GYNECOLOGY

## 2017-07-07 PROCEDURE — 71000015 ZZH RECOVERY PHASE 1 LEVEL 2 EA ADDTL HR: Performed by: OBSTETRICS & GYNECOLOGY

## 2017-07-07 PROCEDURE — 25000132 ZZH RX MED GY IP 250 OP 250 PS 637: Performed by: OBSTETRICS & GYNECOLOGY

## 2017-07-07 PROCEDURE — 0UT70ZZ RESECTION OF BILATERAL FALLOPIAN TUBES, OPEN APPROACH: ICD-10-PCS | Performed by: OBSTETRICS & GYNECOLOGY

## 2017-07-07 PROCEDURE — 81025 URINE PREGNANCY TEST: CPT | Performed by: ANESTHESIOLOGY

## 2017-07-07 PROCEDURE — 25000128 H RX IP 250 OP 636: Performed by: OBSTETRICS & GYNECOLOGY

## 2017-07-07 PROCEDURE — 25000566 ZZH SEVOFLURANE, EA 15 MIN: Performed by: OBSTETRICS & GYNECOLOGY

## 2017-07-07 PROCEDURE — 37000009 ZZH ANESTHESIA TECHNICAL FEE, EACH ADDTL 15 MIN: Performed by: OBSTETRICS & GYNECOLOGY

## 2017-07-07 PROCEDURE — 25000125 ZZHC RX 250: Performed by: NURSE ANESTHETIST, CERTIFIED REGISTERED

## 2017-07-07 PROCEDURE — 36000064 ZZH SURGERY LEVEL 4 EA 15 ADDTL MIN - UMMC: Performed by: OBSTETRICS & GYNECOLOGY

## 2017-07-07 PROCEDURE — P9041 ALBUMIN (HUMAN),5%, 50ML: HCPCS | Performed by: NURSE ANESTHETIST, CERTIFIED REGISTERED

## 2017-07-07 PROCEDURE — 0UTC0ZZ RESECTION OF CERVIX, OPEN APPROACH: ICD-10-PCS | Performed by: OBSTETRICS & GYNECOLOGY

## 2017-07-07 PROCEDURE — 27210794 ZZH OR GENERAL SUPPLY STERILE: Performed by: OBSTETRICS & GYNECOLOGY

## 2017-07-07 PROCEDURE — 25000125 ZZHC RX 250: Performed by: OBSTETRICS & GYNECOLOGY

## 2017-07-07 PROCEDURE — 12000008 ZZH R&B INTERMEDIATE UMMC

## 2017-07-07 PROCEDURE — 0UT00ZZ RESECTION OF RIGHT OVARY, OPEN APPROACH: ICD-10-PCS | Performed by: OBSTETRICS & GYNECOLOGY

## 2017-07-07 PROCEDURE — 0UT90ZZ RESECTION OF UTERUS, OPEN APPROACH: ICD-10-PCS | Performed by: OBSTETRICS & GYNECOLOGY

## 2017-07-07 PROCEDURE — C9290 INJ, BUPIVACAINE LIPOSOME: HCPCS | Performed by: ANESTHESIOLOGY

## 2017-07-07 PROCEDURE — 40000171 ZZH STATISTIC PRE-PROCEDURE ASSESSMENT III: Performed by: OBSTETRICS & GYNECOLOGY

## 2017-07-07 PROCEDURE — 88309 TISSUE EXAM BY PATHOLOGIST: CPT | Performed by: OBSTETRICS & GYNECOLOGY

## 2017-07-07 PROCEDURE — 37000008 ZZH ANESTHESIA TECHNICAL FEE, 1ST 30 MIN: Performed by: OBSTETRICS & GYNECOLOGY

## 2017-07-07 PROCEDURE — 36000062 ZZH SURGERY LEVEL 4 1ST 30 MIN - UMMC: Performed by: OBSTETRICS & GYNECOLOGY

## 2017-07-07 RX ORDER — LIDOCAINE 40 MG/G
CREAM TOPICAL
Status: DISCONTINUED | OUTPATIENT
Start: 2017-07-07 | End: 2017-07-09 | Stop reason: HOSPADM

## 2017-07-07 RX ORDER — CEFAZOLIN SODIUM 2 G/100ML
2 INJECTION, SOLUTION INTRAVENOUS
Status: COMPLETED | OUTPATIENT
Start: 2017-07-07 | End: 2017-07-07

## 2017-07-07 RX ORDER — ACETAMINOPHEN 325 MG/1
650 TABLET ORAL EVERY 6 HOURS
Status: DISCONTINUED | OUTPATIENT
Start: 2017-07-07 | End: 2017-07-09 | Stop reason: HOSPADM

## 2017-07-07 RX ORDER — HYDROMORPHONE HYDROCHLORIDE 1 MG/ML
.3-.5 INJECTION, SOLUTION INTRAMUSCULAR; INTRAVENOUS; SUBCUTANEOUS
Status: DISCONTINUED | OUTPATIENT
Start: 2017-07-07 | End: 2017-07-09 | Stop reason: HOSPADM

## 2017-07-07 RX ORDER — GLYCOPYRROLATE 0.2 MG/ML
INJECTION, SOLUTION INTRAMUSCULAR; INTRAVENOUS PRN
Status: DISCONTINUED | OUTPATIENT
Start: 2017-07-07 | End: 2017-07-07

## 2017-07-07 RX ORDER — SODIUM CHLORIDE, SODIUM LACTATE, POTASSIUM CHLORIDE, CALCIUM CHLORIDE 600; 310; 30; 20 MG/100ML; MG/100ML; MG/100ML; MG/100ML
INJECTION, SOLUTION INTRAVENOUS CONTINUOUS
Status: DISCONTINUED | OUTPATIENT
Start: 2017-07-07 | End: 2017-07-08

## 2017-07-07 RX ORDER — FENTANYL CITRATE 50 UG/ML
25-50 INJECTION, SOLUTION INTRAMUSCULAR; INTRAVENOUS
Status: DISCONTINUED | OUTPATIENT
Start: 2017-07-07 | End: 2017-07-07 | Stop reason: HOSPADM

## 2017-07-07 RX ORDER — IBUPROFEN 200 MG
600 TABLET ORAL EVERY 6 HOURS
Status: DISCONTINUED | OUTPATIENT
Start: 2017-07-07 | End: 2017-07-09 | Stop reason: HOSPADM

## 2017-07-07 RX ORDER — NALOXONE HYDROCHLORIDE 0.4 MG/ML
.1-.4 INJECTION, SOLUTION INTRAMUSCULAR; INTRAVENOUS; SUBCUTANEOUS
Status: DISCONTINUED | OUTPATIENT
Start: 2017-07-07 | End: 2017-07-07 | Stop reason: HOSPADM

## 2017-07-07 RX ORDER — BISACODYL 10 MG
10 SUPPOSITORY, RECTAL RECTAL DAILY
Status: DISCONTINUED | OUTPATIENT
Start: 2017-07-08 | End: 2017-07-09

## 2017-07-07 RX ORDER — FENTANYL CITRATE 50 UG/ML
25-50 INJECTION, SOLUTION INTRAMUSCULAR; INTRAVENOUS EVERY 5 MIN PRN
Status: DISCONTINUED | OUTPATIENT
Start: 2017-07-07 | End: 2017-07-07

## 2017-07-07 RX ORDER — ONDANSETRON 4 MG/1
4 TABLET, ORALLY DISINTEGRATING ORAL EVERY 8 HOURS PRN
Status: DISCONTINUED | OUTPATIENT
Start: 2017-07-08 | End: 2017-07-09 | Stop reason: HOSPADM

## 2017-07-07 RX ORDER — BUPIVACAINE HYDROCHLORIDE AND EPINEPHRINE 2.5; 5 MG/ML; UG/ML
INJECTION, SOLUTION INFILTRATION; PERINEURAL PRN
Status: DISCONTINUED | OUTPATIENT
Start: 2017-07-07 | End: 2017-07-07

## 2017-07-07 RX ORDER — CALCIUM CARBONATE 500 MG/1
500-1000 TABLET, CHEWABLE ORAL 4 TIMES DAILY PRN
Status: DISCONTINUED | OUTPATIENT
Start: 2017-07-07 | End: 2017-07-09 | Stop reason: HOSPADM

## 2017-07-07 RX ORDER — SODIUM CHLORIDE, SODIUM LACTATE, POTASSIUM CHLORIDE, CALCIUM CHLORIDE 600; 310; 30; 20 MG/100ML; MG/100ML; MG/100ML; MG/100ML
INJECTION, SOLUTION INTRAVENOUS CONTINUOUS PRN
Status: DISCONTINUED | OUTPATIENT
Start: 2017-07-07 | End: 2017-07-07

## 2017-07-07 RX ORDER — FLUMAZENIL 0.1 MG/ML
0.2 INJECTION, SOLUTION INTRAVENOUS
Status: DISCONTINUED | OUTPATIENT
Start: 2017-07-07 | End: 2017-07-07 | Stop reason: HOSPADM

## 2017-07-07 RX ORDER — PROPOFOL 10 MG/ML
INJECTION, EMULSION INTRAVENOUS PRN
Status: DISCONTINUED | OUTPATIENT
Start: 2017-07-07 | End: 2017-07-07

## 2017-07-07 RX ORDER — ONDANSETRON 4 MG/1
4 TABLET, ORALLY DISINTEGRATING ORAL EVERY 30 MIN PRN
Status: DISCONTINUED | OUTPATIENT
Start: 2017-07-07 | End: 2017-07-07

## 2017-07-07 RX ORDER — DEXAMETHASONE SODIUM PHOSPHATE 4 MG/ML
INJECTION, SOLUTION INTRA-ARTICULAR; INTRALESIONAL; INTRAMUSCULAR; INTRAVENOUS; SOFT TISSUE PRN
Status: DISCONTINUED | OUTPATIENT
Start: 2017-07-07 | End: 2017-07-07

## 2017-07-07 RX ORDER — CEFAZOLIN SODIUM 1 G/3ML
1 INJECTION, POWDER, FOR SOLUTION INTRAMUSCULAR; INTRAVENOUS SEE ADMIN INSTRUCTIONS
Status: DISCONTINUED | OUTPATIENT
Start: 2017-07-07 | End: 2017-07-07 | Stop reason: HOSPADM

## 2017-07-07 RX ORDER — SIMETHICONE 80 MG
80 TABLET,CHEWABLE ORAL 4 TIMES DAILY PRN
Status: DISCONTINUED | OUTPATIENT
Start: 2017-07-07 | End: 2017-07-09 | Stop reason: HOSPADM

## 2017-07-07 RX ORDER — LIDOCAINE 40 MG/G
CREAM TOPICAL
Status: DISCONTINUED | OUTPATIENT
Start: 2017-07-07 | End: 2017-07-07 | Stop reason: HOSPADM

## 2017-07-07 RX ORDER — NALOXONE HYDROCHLORIDE 0.4 MG/ML
.1-.4 INJECTION, SOLUTION INTRAMUSCULAR; INTRAVENOUS; SUBCUTANEOUS
Status: DISCONTINUED | OUTPATIENT
Start: 2017-07-07 | End: 2017-07-09 | Stop reason: HOSPADM

## 2017-07-07 RX ORDER — ONDANSETRON 2 MG/ML
INJECTION INTRAMUSCULAR; INTRAVENOUS PRN
Status: DISCONTINUED | OUTPATIENT
Start: 2017-07-07 | End: 2017-07-07

## 2017-07-07 RX ORDER — ONDANSETRON 2 MG/ML
4 INJECTION INTRAMUSCULAR; INTRAVENOUS EVERY 30 MIN PRN
Status: DISCONTINUED | OUTPATIENT
Start: 2017-07-07 | End: 2017-07-07

## 2017-07-07 RX ORDER — SODIUM CHLORIDE, SODIUM LACTATE, POTASSIUM CHLORIDE, CALCIUM CHLORIDE 600; 310; 30; 20 MG/100ML; MG/100ML; MG/100ML; MG/100ML
INJECTION, SOLUTION INTRAVENOUS CONTINUOUS
Status: DISCONTINUED | OUTPATIENT
Start: 2017-07-07 | End: 2017-07-07 | Stop reason: HOSPADM

## 2017-07-07 RX ORDER — DIPHENHYDRAMINE HCL 25 MG
25 CAPSULE ORAL EVERY 6 HOURS PRN
Status: DISCONTINUED | OUTPATIENT
Start: 2017-07-07 | End: 2017-07-09 | Stop reason: HOSPADM

## 2017-07-07 RX ORDER — DIPHENHYDRAMINE HYDROCHLORIDE 50 MG/ML
25 INJECTION INTRAMUSCULAR; INTRAVENOUS EVERY 6 HOURS PRN
Status: DISCONTINUED | OUTPATIENT
Start: 2017-07-07 | End: 2017-07-09 | Stop reason: HOSPADM

## 2017-07-07 RX ORDER — LISINOPRIL 20 MG/1
20 TABLET ORAL EVERY MORNING
Status: DISCONTINUED | OUTPATIENT
Start: 2017-07-08 | End: 2017-07-09 | Stop reason: HOSPADM

## 2017-07-07 RX ORDER — HYDROMORPHONE HYDROCHLORIDE 1 MG/ML
.3-.5 INJECTION, SOLUTION INTRAMUSCULAR; INTRAVENOUS; SUBCUTANEOUS EVERY 5 MIN PRN
Status: DISCONTINUED | OUTPATIENT
Start: 2017-07-07 | End: 2017-07-07 | Stop reason: HOSPADM

## 2017-07-07 RX ORDER — FENTANYL CITRATE 50 UG/ML
INJECTION, SOLUTION INTRAMUSCULAR; INTRAVENOUS PRN
Status: DISCONTINUED | OUTPATIENT
Start: 2017-07-07 | End: 2017-07-07

## 2017-07-07 RX ORDER — OXYCODONE HYDROCHLORIDE 5 MG/1
5-10 TABLET ORAL
Status: DISCONTINUED | OUTPATIENT
Start: 2017-07-07 | End: 2017-07-08

## 2017-07-07 RX ORDER — HYDROMORPHONE HYDROCHLORIDE 1 MG/ML
.3-.5 INJECTION, SOLUTION INTRAMUSCULAR; INTRAVENOUS; SUBCUTANEOUS EVERY 5 MIN PRN
Status: DISCONTINUED | OUTPATIENT
Start: 2017-07-07 | End: 2017-07-07

## 2017-07-07 RX ORDER — HEPARIN SODIUM 5000 [USP'U]/.5ML
INJECTION, SOLUTION INTRAVENOUS; SUBCUTANEOUS PRN
Status: DISCONTINUED | OUTPATIENT
Start: 2017-07-07 | End: 2017-07-07

## 2017-07-07 RX ORDER — ONDANSETRON 4 MG/1
4 TABLET, ORALLY DISINTEGRATING ORAL EVERY 8 HOURS
Status: COMPLETED | OUTPATIENT
Start: 2017-07-07 | End: 2017-07-08

## 2017-07-07 RX ORDER — METOPROLOL TARTRATE 1 MG/ML
1-2 INJECTION, SOLUTION INTRAVENOUS EVERY 5 MIN PRN
Status: DISCONTINUED | OUTPATIENT
Start: 2017-07-07 | End: 2017-07-07 | Stop reason: HOSPADM

## 2017-07-07 RX ORDER — ALBUMIN, HUMAN INJ 5% 5 %
SOLUTION INTRAVENOUS CONTINUOUS PRN
Status: DISCONTINUED | OUTPATIENT
Start: 2017-07-07 | End: 2017-07-07

## 2017-07-07 RX ORDER — ONDANSETRON 4 MG/1
4 TABLET, ORALLY DISINTEGRATING ORAL EVERY 30 MIN PRN
Status: DISCONTINUED | OUTPATIENT
Start: 2017-07-07 | End: 2017-07-07 | Stop reason: HOSPADM

## 2017-07-07 RX ORDER — SODIUM CHLORIDE, SODIUM LACTATE, POTASSIUM CHLORIDE, CALCIUM CHLORIDE 600; 310; 30; 20 MG/100ML; MG/100ML; MG/100ML; MG/100ML
INJECTION, SOLUTION INTRAVENOUS CONTINUOUS
Status: DISCONTINUED | OUTPATIENT
Start: 2017-07-07 | End: 2017-07-07

## 2017-07-07 RX ORDER — ONDANSETRON 2 MG/ML
4 INJECTION INTRAMUSCULAR; INTRAVENOUS EVERY 30 MIN PRN
Status: DISCONTINUED | OUTPATIENT
Start: 2017-07-07 | End: 2017-07-07 | Stop reason: HOSPADM

## 2017-07-07 RX ORDER — LIDOCAINE HYDROCHLORIDE 20 MG/ML
INJECTION, SOLUTION INFILTRATION; PERINEURAL PRN
Status: DISCONTINUED | OUTPATIENT
Start: 2017-07-07 | End: 2017-07-07

## 2017-07-07 RX ORDER — AMOXICILLIN 250 MG
1-2 CAPSULE ORAL 2 TIMES DAILY
Status: DISCONTINUED | OUTPATIENT
Start: 2017-07-07 | End: 2017-07-09

## 2017-07-07 RX ADMIN — PROPOFOL 200 MG: 10 INJECTION, EMULSION INTRAVENOUS at 13:10

## 2017-07-07 RX ADMIN — ROCURONIUM BROMIDE 50 MG: 10 INJECTION INTRAVENOUS at 13:10

## 2017-07-07 RX ADMIN — FENTANYL CITRATE 25 MCG: 50 INJECTION, SOLUTION INTRAMUSCULAR; INTRAVENOUS at 16:45

## 2017-07-07 RX ADMIN — ONDANSETRON 4 MG: 2 INJECTION INTRAMUSCULAR; INTRAVENOUS at 13:30

## 2017-07-07 RX ADMIN — SODIUM CHLORIDE, POTASSIUM CHLORIDE, SODIUM LACTATE AND CALCIUM CHLORIDE 1000 ML: 600; 310; 30; 20 INJECTION, SOLUTION INTRAVENOUS at 16:56

## 2017-07-07 RX ADMIN — LIDOCAINE HYDROCHLORIDE 100 MG: 20 INJECTION, SOLUTION INFILTRATION; PERINEURAL at 13:10

## 2017-07-07 RX ADMIN — MAGNESIUM HYDROXIDE 15 ML: 400 SUSPENSION ORAL at 20:03

## 2017-07-07 RX ADMIN — FENTANYL CITRATE 50 MCG: 50 INJECTION, SOLUTION INTRAMUSCULAR; INTRAVENOUS at 16:53

## 2017-07-07 RX ADMIN — Medication 0.5 MG: at 17:21

## 2017-07-07 RX ADMIN — HYDROMORPHONE HYDROCHLORIDE 0.3 MG: 1 INJECTION, SOLUTION INTRAMUSCULAR; INTRAVENOUS; SUBCUTANEOUS at 23:32

## 2017-07-07 RX ADMIN — Medication 0.2 MG: at 17:37

## 2017-07-07 RX ADMIN — BUPIVACAINE 20 ML: 13.3 INJECTION, SUSPENSION, LIPOSOMAL INFILTRATION at 11:40

## 2017-07-07 RX ADMIN — HYDROMORPHONE HYDROCHLORIDE 0.5 MG: 1 INJECTION, SOLUTION INTRAMUSCULAR; INTRAVENOUS; SUBCUTANEOUS at 15:50

## 2017-07-07 RX ADMIN — DEXAMETHASONE SODIUM PHOSPHATE 10 MG: 4 INJECTION, SOLUTION INTRA-ARTICULAR; INTRALESIONAL; INTRAMUSCULAR; INTRAVENOUS; SOFT TISSUE at 13:30

## 2017-07-07 RX ADMIN — HYDROMORPHONE HYDROCHLORIDE 0.5 MG: 1 INJECTION, SOLUTION INTRAMUSCULAR; INTRAVENOUS; SUBCUTANEOUS at 13:53

## 2017-07-07 RX ADMIN — FENTANYL CITRATE 50 MCG: 50 INJECTION, SOLUTION INTRAMUSCULAR; INTRAVENOUS at 11:32

## 2017-07-07 RX ADMIN — BUPIVACAINE HYDROCHLORIDE AND EPINEPHRINE BITARTRATE 40 ML: 2.5; .005 INJECTION, SOLUTION INFILTRATION; PERINEURAL at 11:40

## 2017-07-07 RX ADMIN — SUGAMMADEX 200 MG: 100 INJECTION, SOLUTION INTRAVENOUS at 15:49

## 2017-07-07 RX ADMIN — IBUPROFEN 600 MG: 200 TABLET, FILM COATED ORAL at 18:39

## 2017-07-07 RX ADMIN — GLYCOPYRROLATE 0.2 MG: 0.2 INJECTION, SOLUTION INTRAMUSCULAR; INTRAVENOUS at 13:58

## 2017-07-07 RX ADMIN — SODIUM CHLORIDE, POTASSIUM CHLORIDE, SODIUM LACTATE AND CALCIUM CHLORIDE: 600; 310; 30; 20 INJECTION, SOLUTION INTRAVENOUS at 13:00

## 2017-07-07 RX ADMIN — ACETAMINOPHEN 650 MG: 325 TABLET, FILM COATED ORAL at 22:38

## 2017-07-07 RX ADMIN — GLYCOPYRROLATE 0.2 MG: 0.2 INJECTION, SOLUTION INTRAMUSCULAR; INTRAVENOUS at 13:53

## 2017-07-07 RX ADMIN — ONDANSETRON 4 MG: 4 TABLET, ORALLY DISINTEGRATING ORAL at 20:03

## 2017-07-07 RX ADMIN — Medication 0.3 MG: at 17:07

## 2017-07-07 RX ADMIN — FENTANYL CITRATE 50 MCG: 50 INJECTION, SOLUTION INTRAMUSCULAR; INTRAVENOUS at 13:42

## 2017-07-07 RX ADMIN — ROCURONIUM BROMIDE 50 MG: 10 INJECTION INTRAVENOUS at 14:23

## 2017-07-07 RX ADMIN — Medication 0.2 MG: at 17:56

## 2017-07-07 RX ADMIN — FENTANYL CITRATE 25 MCG: 50 INJECTION, SOLUTION INTRAMUSCULAR; INTRAVENOUS at 17:01

## 2017-07-07 RX ADMIN — HEPARIN SODIUM 5000 UNITS: 10000 INJECTION, SOLUTION INTRAVENOUS; SUBCUTANEOUS at 13:17

## 2017-07-07 RX ADMIN — IBUPROFEN 600 MG: 200 TABLET, FILM COATED ORAL at 23:26

## 2017-07-07 RX ADMIN — CEFAZOLIN SODIUM 2 G: 2 INJECTION, SOLUTION INTRAVENOUS at 13:28

## 2017-07-07 RX ADMIN — CEFAZOLIN SODIUM 1 G: 2 INJECTION, SOLUTION INTRAVENOUS at 15:16

## 2017-07-07 RX ADMIN — MIDAZOLAM HYDROCHLORIDE 2 MG: 1 INJECTION, SOLUTION INTRAMUSCULAR; INTRAVENOUS at 13:00

## 2017-07-07 RX ADMIN — MIDAZOLAM 1 MG: 1 INJECTION INTRAMUSCULAR; INTRAVENOUS at 11:32

## 2017-07-07 RX ADMIN — ALBUMIN (HUMAN): 12.5 SOLUTION INTRAVENOUS at 14:40

## 2017-07-07 RX ADMIN — SODIUM CHLORIDE, POTASSIUM CHLORIDE, SODIUM LACTATE AND CALCIUM CHLORIDE: 600; 310; 30; 20 INJECTION, SOLUTION INTRAVENOUS at 14:51

## 2017-07-07 RX ADMIN — FENTANYL CITRATE 50 MCG: 50 INJECTION, SOLUTION INTRAMUSCULAR; INTRAVENOUS at 15:54

## 2017-07-07 RX ADMIN — ACETAMINOPHEN 650 MG: 325 TABLET, FILM COATED ORAL at 17:57

## 2017-07-07 RX ADMIN — FENTANYL CITRATE 100 MCG: 50 INJECTION, SOLUTION INTRAMUSCULAR; INTRAVENOUS at 13:10

## 2017-07-07 ASSESSMENT — PAIN DESCRIPTION - DESCRIPTORS
DESCRIPTORS: SORE
DESCRIPTORS: SORE

## 2017-07-07 NOTE — OR NURSING
Received report from Haleigh Naik RN.  Pt care assumed.  Pt stable.   All at bedside in PACU.  Pt holding in PACU for bed on 7C.

## 2017-07-07 NOTE — IP AVS SNAPSHOT
MRN:[id number]                      After Visit Summary   7/7/2017    Fabián Phoenix    MRN: [id number]           Thank you!     Thank you for choosing [facility] for your care. Our goal is always to provide you with excellent care. Hearing back from our patients is one way we can continue to improve our services. Please take a few minutes to complete the written survey that you may receive in the mail after you visit with us. Thank you!        Patient Information     Date Of Birth          1970        Designated Caregiver       Most Recent Value    Caregiver    Will someone help with your care after discharge? yes    Name of designated caregiver Ashwin [Pt's ]    Phone number of caregiver [phone]    Caregiver [place] [address] [facility] [place]       About your hospital stay     You were admitted on:  July 7, 2017 You last received care in the:  [address] [place] [address]    You were discharged on:  July 9, 2017        Reason for your hospital stay       You were in the hospital for surgery.                  Who to Call     For medical emergencies, please call 911.  For non-urgent questions about your medical care, please call your primary care provider or clinic, [phone]  For questions related to your surgery, please call your surgery clinic        Attending Provider     Provider Specialty    Byron Cook MD Obstetrics & Gynecology, Maternal & Fetal Medicine    [facility]       Primary Care Provider Office Phone # Fax #    Mere Alvarado [phone]       When to contact your care team       Call the Gyn Oncology clinic or return to the ED if you have any of the following: temperature greater than 100.4F, pain not controlled by pain medications, intractable nausea/vomiting, foul-smelling vaginal discharge, vaginal bleeding soaking 1 pad per hour for 2 hours in a row.                  After Care Instructions     Activity       Your  "activity upon discharge: activity as tolerated, no sex for 8 weeks and no heavy lifting (10-15lb) for 6 weeks            Diet       Follow this diet upon discharge: Regular                  Follow-up Appointments     Follow Up and recommended labs and tests       Follow-up at your scheduled post-op visit on 7/31                  Your next 10 appointments already scheduled     Jul 31, 2017  1:40 PM CDT   (Arrive by 1:25 PM)   Post-Op with Byron Cook MD   [facility]    [address] [place] [id number] [phone]              Additional Information     If you use hormonal birth control (such as the pill, patch, ring or implants): You'll need a second form of birth control for 7 days (condoms, a diaphragm or contraceptive foam). While in the hospital, you received a medicine called Bridion. Your normal birth control will not work as well for a week after taking this medicine.          Pending Results     Date and Time Order Name Status Description    7/7/2017 1445 Surgical pathology exam Preliminary             Statement of Approval     Ordered          07/09/17 0958  I have reviewed and agree with all the recommendations and orders detailed in this document.  EFFECTIVE NOW     Approved and electronically signed by:  Rowena Miller MD             Admission Information     Date & Time Provider Department Dept. Phone    7/7/2017 Fay Burgos MD [address] [facility] [phone]      Your Vitals Were     Blood Pressure Pulse Temperature Respirations Height Weight    141/85 (BP Location: Right arm) 84 96.7  F (35.9  C) (Oral) 16 1.702 m (5' 7\") 85.6 kg (188 lb 12.8 oz)    Last Period Pulse Oximetry BMI (Body Mass Index)             06/25/2017 95% 29.57 kg/m2         [email or web address] Information     [employer] gives you secure access to your electronic health record. If you see a primary care provider, you can also send " messages to your care team and make appointments. If you have questions, please call your primary care clinic.  If you do not have a primary care provider, please call [phone] and they will assist you.        Care EveryWhere ID     This is your Care EveryWhere ID. This could be used by other organizations to access your [facility] medical records  [id number]        Equal Access to Services     [facility] Center[facility]ENNIFER : Hadii lina ramírez hadrebekah Sorajeshali, waaxda luqadaha, qaybta kaalmada adeverónicajohanneda, kaleigh kcchrislinus berg . [facility] [phone].    ATENCIÓN: Si habla español, tiene a espinal disposición servicios gratuitos de asistencia lingüística. Anjali al [phone].    We comply with applicable federal civil rights laws and [place] laws. We do not discriminate on the basis of race, color, national origin, age, disability sex, sexual orientation or gender identity.               Review of your medicines      START taking        Dose / Directions    acetaminophen 325 MG tablet   Commonly known as:  TYLENOL   Used for:  Acute post-operative pain        Dose:  650 mg   Take 2 tablets (650 mg) by mouth every 6 hours   Quantity:  50 tablet   Refills:  0       HYDROmorphone 2 MG tablet   Commonly known as:  DILAUDID   Used for:  Acute post-operative pain        Dose:  2-4 mg   Take 1-2 tablets (2-4 mg) by mouth every 3 hours as needed for moderate to severe pain   Quantity:  30 tablet   Refills:  0       simethicone 80 MG chewable tablet   Commonly known as:  MYLICON        Dose:  80 mg   Take 1 tablet (80 mg) by mouth 4 times daily as needed for cramping (gas)   Quantity:  20 tablet   Refills:  0         CONTINUE these medicines which may have CHANGED, or have new prescriptions. If we are uncertain of the size of tablets/capsules you have at home, strength may be listed as something that might have changed.        Dose / Directions    ibuprofen 600 MG tablet   Commonly known as:  ADVIL/MOTRIN   This may have  changed:    - medication strength  - how much to take  - when to take this  - reasons to take this   Used for:  Acute post-operative pain        Dose:  600 mg   Take 1 tablet (600 mg) by mouth every 6 hours as needed for mild pain   Quantity:  30 tablet   Refills:  0         CONTINUE these medicines which have NOT CHANGED        Dose / Directions    lisinopril 20 MG tablet   Commonly known as:  PRINIVIL/ZESTRIL        Dose:  20 mg   Take 20 mg by mouth every morning   Refills:  0       loratadine-pseudoePHEDrine  MG per 24 hr tablet   Commonly known as:  CLARITIN-D 24-hour        Dose:  1 tablet   Take 1 tablet by mouth Stopped 6/28/17 for her surgery per her    Refills:  0       MULTI-VITAMINS Tabs        Dose:  1 tablet   Take 1 tablet by mouth Stopped 6/28/17 for her surgery per her DRAnna   Refills:  0            Where to get your medicines      These medications were sent to [facility] [place], [place] - [address] [facility]  [address] [facility] [place] [address]     Phone:  [phone]     acetaminophen 325 MG tablet    ibuprofen 600 MG tablet    simethicone 80 MG chewable tablet         Some of these will need a paper prescription and others can be bought over the counter. Ask your nurse if you have questions.     Bring a paper prescription for each of these medications     HYDROmorphone 2 MG tablet                Protect others around you: Learn how to safely use, store and throw away your medicines at www.disposemymeds.org.             Medication List: This is a list of all your medications and when to take them. Check marks below indicate your daily home schedule. Keep this list as a reference.      Medications           Morning Afternoon Evening Bedtime As Needed    acetaminophen 325 MG tablet   Commonly known as:  TYLENOL   Take 2 tablets (650 mg) by mouth every 6 hours   Last time this was given:  650 mg on 7/9/2017 10:57 AM                                HYDROmorphone 2 MG  tablet   Commonly known as:  DILAUDID   Take 1-2 tablets (2-4 mg) by mouth every 3 hours as needed for moderate to severe pain   Last time this was given:  2 mg on 7/9/2017  8:53 AM                                ibuprofen 600 MG tablet   Commonly known as:  ADVIL/MOTRIN   Take 1 tablet (600 mg) by mouth every 6 hours as needed for mild pain   Last time this was given:  600 mg on 7/9/2017  8:45 AM                                lisinopril 20 MG tablet   Commonly known as:  PRINIVIL/ZESTRIL   Take 20 mg by mouth every morning   Last time this was given:  20 mg on 7/9/2017  8:53 AM                                loratadine-pseudoePHEDrine  MG per 24 hr tablet   Commonly known as:  CLARITIN-D 24-hour   Take 1 tablet by mouth Stopped 6/28/17 for her surgery per her DRAnna                                MULTI-VITAMINS Tabs   Take 1 tablet by mouth Stopped 6/28/17 for her surgery per her DRAnna                                simethicone 80 MG chewable tablet   Commonly known as:  MYLICON   Take 1 tablet (80 mg) by mouth 4 times daily as needed for cramping (gas)   Last time this was given:  80 mg on 7/9/2017  8:54 AM                                          More Information        Discharge Instructions for Abdominal Hysterectomy  You had a procedure called abdominal hysterectomy, a surgery to remove your uterus. This can relieve such problems as severe pain and bleeding. It usually takes from 4 to 6 weeks to recover from abdominal hysterectomy. Remember, though, that recovery time varies from woman to woman.     When to call your doctor  Call your doctor right away if you have any of the following:    Fever above 100.4 F (38 C)     Chills    Bright red vaginal bleeding or vaginal bleeding thatsoaks more than 1 pad per hour    A smelly discharge from the vagina    Trouble urinating or burning when you urinate    Severe pain or bloating in your abdomen    Redness, swelling, or drainage at your incision site    Shortness of  breath or chest pain    Nausea and vomiting      Home care  These are suggestions for what to do once you are home:    Don t drive until your doctor says it's OK. Don t drive while you are still taking opioid pain medications.    Ask others to help with chores and errands while you recover.    Don t lift anything heavier than 10 pounds for 6 weeks.    Don t vacuum or do other strenuous activities until the doctor says it s OK.    Walk as often as you feel able.    When you must climb stairs, go slowly and pause after every few steps.    Continue the coughing and deep breathing exercises that you learned in the hospital.    Avoid constipation:    Eat fruits, vegetables, and whole grains.    Drink 6 to 8 glasses of water a day, unless directed otherwise.    Use a laxative or a mild stool softener if your doctor says it s OK.    Shower as usual. Wash your incision with mild soap and water. Do not scrub the incision to clean it. Pat it dry.    Don t use oils, powders, or lotions on your incision.    Don t put anything in your vagina until your doctor says it s safe to do so. Don t use tampons or douches. Don t have sex. Do not do any of these things for 6 weeks.    If you had both ovaries removed, report hot flashes, mood swings, and irritability to your doctor. There may be medications that can help you.  Follow-up    Ask your doctor when you can return to work.  Date Last Reviewed: 5/19/2015 2000-2017 [employer]. [address] [place] [address], [place] [address]. All rights reserved. This information is not intended as a substitute for professional medical care. Always follow your healthcare professional's instructions.

## 2017-07-07 NOTE — ANESTHESIA PROCEDURE NOTES
Peripheral Nerve Block Procedure Note    Staff:     Anesthesiologist:  TALITA COOK  Location: Pre-op  Procedure Start/Stop TImes:      7/7/2017 11:30 AM     7/7/2017 11:46 AM    patient identified, IV checked, site marked, risks and benefits discussed, informed consent, monitors and equipment checked, pre-op evaluation, at physician/surgeon's request and post-op pain management      Correct Patient: Yes      Correct Position: Yes      Correct Site: Yes      Correct Procedure: Yes      Correct Laterality:  Yes    Site Marked:  Yes  Procedure details:     Procedure:  Quadratus lumborum    ASA:  2    Diagnosis:  QUENTIN    Laterality:  Bilateral    Position:  Prone    Sterile Prep: chloraprep, mask and sterile gloves      Local skin infiltration:  2% lidocaine    amount (mL):  4    Needle:  Short bevel and insulated    Needle gauge:  21    Needle length (inches):  4    Ultrasound: Yes      Ultrasound used to identify targeted nerve, plexus, or vascular structure and placed a needle adjacent to it      Permanent Image entered into patiient's record      Abnormal pain on injection: No      Blood Aspirated: No      Paresthesias:  No    Bleeding at site: No      Bolus via:  Needle    Infusion Method:  Single Shot    Complications:  None  Assessment/Narrative:      Informed consent obtained.  All risks and benefits of the nerve block discussed with the patient.  All questions answered and all parties agreed with the plan.

## 2017-07-07 NOTE — OP NOTE
Operative Note    Patient: Fabián Phoenix  : 1970  MRN: [id number]    Date of Service: 2017    Pre-operative diagnosis:  1. Endometrial intraepithelial neoplasia  2. Fibroid uterus    Post-operative diagnosis:  1. Same    Procedure:   1. Exploratory laparotomy  2. Total abdominal hysterectomy  3. Right salpingo-oophorectomy  4. Left salpingectomy    Surgeon: Byron Cook MD  Assistants: Rowena Miller MD, PGY4    Anesthesia: General    EBL: 200cc  Urine: 500cc  Fluids: 1400cc    Specimens: Uterus, cervix, bilateral fallopian tubes, right ovary    Complications: none apparent    Findings: Large fibroid uterus, approximately 20-weeks size with multiple intramural, subserosal, and pedunculated fibroids.  Normal appearing bilateral fallopian tubes.  Normal appearing left ovary.  Right ovary with hemorrhagic cyst.  No evidence of intraabdominal disease.    Indications: Fabián Phoenix is a 46 year old female who was found on endometrial biopsy to have endometrial intraepithelial neoplasia and was sent to Gyn Oncology for consultation.  She was advised to undergo hysterectomy with plan made for open surgery due to the large size of the fibroid uterus.  Risks, benefits and alternatives to proceed discussed in detail with the patient. Risks include but are not limited to bleeding, infection, possible injury to surrounding organs including bowel, bladder, ureter, need for second procedure/surgery related to complications from first procedure, postoperative medical complications such as cardiopulmonary events, lymphedema, lymphocyst, thromboembolic events. The patient's questions were answered, understanding confirmed, and the patient signed written informed consent.    Procedure: The patient was taken to the operating room where she was placed under general anesthesia.  She was prepped and draped in the standard fashion with the left arm was tucked.  A Time Out was performed.  A vertical  midline laparotomy was made extending from the pubic symphysis to just above the umbilicus.  The skin incision was made sharply with dissection carried down to the fascia using bovie cautery.  The fascia was incised with a scalpel.  The midline identified by dissecting off the rectus muscles, the peritoneum identified and opened sharply with a scalpel.  The patient was placed in Trendelenburg.  The Bookwalter self-retaining retractor was assembled and the bowels packed into the upper abdomen.  Kocher clamps were placed on the uterine cornua bilaterally. The uterus was retracted out of the pelvis. Attention was turned to the right side.  The right round ligament was transected with cautery. The broad ligament was incised, opened, and the posterior leaf cauterized toward the lateral pelvic sidewall parallel to the IP ligament. The ureter was identified and noted to be out of the surgical field. A window was created in the mesosalpinx using cautery. The infundibulo-pelvic ligament was doubly clamped and cut between the clamps. The proximal end was tied with two free ties of 2-0 vicryl, and the distal end with one free tie of 2-0 vicryl. The vesicouterine peritoneum was then dissected off from the lower uterine segment. The bladder was dissected down off the lower uterine segment and cervix.  The right uterine artery was clamped, transected, and ligated with 2-0 Vicryl. The the right cardinal ligament was serially clamped, transected, and suture ligated.  At this time the left round ligament was transected, the left retroperitoneal space opened and the ureter identified.  The left uteroovarian ligament and fallopian tube at the cornua were clamped and transected.  The left broad ligament opened and the uterine artery isolated.  The left uterine artery and parametrium was clamped, transected, and ligated.  The right uterosacral was grasped with a double-toothed tenaculum and the cervix amputated from the proximal vagina  using Keisha scissors.  The vagina was cleaned with betadine-soaked sponge sticks.  The vaginal cuff was closed with a running locked stitch of 0-Vicryl with a second running un-locked layer.  The left fallopian tube was excised by clamping across the mesosalpinx and excising. The pedicle was tied off with 2-0 vicryl.  The abdomen was copiously irrigated.  The bowel was run and the abdomen explored with no remaining pathology noted.  The fascia was closed with looped 0-PDS.  The subcutaneous tissue was irrigated.  The subcutaneous tissue was closed with 2-0 Vicryl.  The skin was closed with 4-0 Monocryl.    Instrument, needle, and sponge counts were correct times 2. The patient was transferred to the PACU in stable condition.    Byron Cook MD, MS    Department of Obstetrics and Gynecology   Division of Gynecologic Oncology   [facility]  Phone: [phone]

## 2017-07-07 NOTE — ANESTHESIA CARE TRANSFER NOTE
Patient: Fabián Phoenix    Procedure(s):  Exploratory laparotomy, Abdominal Hysterectomy, Right Salpingo-Oopherectomy, Left Salpingectomy. - Wound Class: II-Clean Contaminated    Diagnosis: Endometrial Hyperplasia With Atypia  Diagnosis Additional Information: No value filed.    Anesthesia Type:   No value filed.     Note:  Airway :Face Mask  Patient transferred to:PACU  Comments: To PACU, VSS, airway patent, RN at bedside.      Vitals: (Last set prior to Anesthesia Care Transfer)    CRNA VITALS  7/7/2017 1540 - 7/7/2017 1616      7/7/2017             Pulse: 84    SpO2: 99 %    Resp Rate (observed): 18                Electronically Signed By: DAVID Langston CRNA  July 7, 2017  4:16 PM

## 2017-07-07 NOTE — PROGRESS NOTES
Block procedure completed.  Patient alert.  Responding appropriately.  Spouse in room.  Comfortable.  Oximeter remains on patient.

## 2017-07-07 NOTE — IP AVS SNAPSHOT
[address] [address]    Phone:  [phone]                                       After Visit Summary   7/7/2017    Fabián Phoenix    MRN: [id number]           After Visit Summary Signature Page     I have received my discharge instructions, and my questions have been answered. I have discussed any challenges I see with this plan with the nurse or doctor.    ..........................................................................................................................................  Patient/Patient Representative Signature      ..........................................................................................................................................  Patient Representative Print Name and Relationship to Patient    ..................................................               ................................................  Date                                            Time    ..........................................................................................................................................  Reviewed by Signature/Title    ...................................................              ..............................................  Date                                                            Time

## 2017-07-07 NOTE — BRIEF OP NOTE
[facility], [place]    Brief Operative Note    Pre-operative diagnosis: Endometrial Hyperplasia With Atypia  Post-operative diagnosis Same  Procedure: Procedure(s):  Exploratory laparotomy, Abdominal Hysterectomy, Right Salpingo-Oopherectomy, Left Salpingectomy. - Wound Class: II-Clean Contaminated  Surgeon: Surgeon(s) and Role:     * Byron Cook MD - Primary     * Rowena Miller MD - Resident - Assisting  Anesthesia: Combined General with Block   Estimated blood loss: 200 ml  Drains: Martins   Specimens:   ID Type Source Tests Collected by Time Destination   A : Uterus, Cervix, Bilateral Fallopian Tubes and Right Ovary Tissue Uterus, Cervix and Bilateral Fallopian Tubes SURGICAL PATHOLOGY EXAM Byron Cook MD 7/7/2017  2:45 PM      Findings:   Large fibroid uterus.  Normal appearing bilateral fallopain tubes and ovaries.  Normal appearing appendeix.  Small bowel without visible pathology; run from cecem to ligament of Trietz..  Complications: None.  Implants: None.    Rowena Miller MD  OB/GYN PGY4

## 2017-07-07 NOTE — ANESTHESIA POSTPROCEDURE EVALUATION
Patient: Fabián Phoenix    Procedure(s):  Exploratory laparotomy, Abdominal Hysterectomy, Right Salpingo-Oopherectomy, Left Salpingectomy. - Wound Class: II-Clean Contaminated    Diagnosis:Endometrial Hyperplasia With Atypia  Diagnosis Additional Information: No value filed.    Anesthesia Type:  No value filed.    Note:  Anesthesia Post Evaluation    Patient location during evaluation: PACU  Patient participation: Able to fully participate in evaluation  Level of consciousness: awake and alert  Pain management: adequate  Airway patency: patent  Cardiovascular status: acceptable and hemodynamically stable  Respiratory status: acceptable  Hydration status: acceptable  PONV: none     Anesthetic complications: None          Last vitals:  Vitals:    07/07/17 1000 07/07/17 1125 07/07/17 1130   BP: 154/85 140/82 (!) 154/101   Resp:  10 15   Temp:      SpO2:  100% 100%         Electronically Signed By: Vernon Knight MD  July 7, 2017  4:19 PM

## 2017-07-08 LAB
ANION GAP SERPL CALCULATED.3IONS-SCNC: 8 MMOL/L (ref 3–14)
BUN SERPL-MCNC: 11 MG/DL (ref 7–30)
CALCIUM SERPL-MCNC: 8.1 MG/DL (ref 8.5–10.1)
CHLORIDE SERPL-SCNC: 102 MMOL/L (ref 94–109)
CO2 SERPL-SCNC: 26 MMOL/L (ref 20–32)
CREAT SERPL-MCNC: 0.62 MG/DL (ref 0.52–1.04)
ERYTHROCYTE [DISTWIDTH] IN BLOOD BY AUTOMATED COUNT: 12.7 % (ref 10–15)
GFR SERPL CREATININE-BSD FRML MDRD: ABNORMAL ML/MIN/1.7M2
GLUCOSE SERPL-MCNC: 130 MG/DL (ref 70–99)
HCT VFR BLD AUTO: 33.9 % (ref 35–47)
HGB BLD-MCNC: 11.4 G/DL (ref 11.7–15.7)
MCH RBC QN AUTO: 29.2 PG (ref 26.5–33)
MCHC RBC AUTO-ENTMCNC: 33.6 G/DL (ref 31.5–36.5)
MCV RBC AUTO: 87 FL (ref 78–100)
PLATELET # BLD AUTO: 195 10E9/L (ref 150–450)
POTASSIUM SERPL-SCNC: 4 MMOL/L (ref 3.4–5.3)
RBC # BLD AUTO: 3.91 10E12/L (ref 3.8–5.2)
SODIUM SERPL-SCNC: 135 MMOL/L (ref 133–144)
WBC # BLD AUTO: 12.4 10E9/L (ref 4–11)

## 2017-07-08 PROCEDURE — 25000132 ZZH RX MED GY IP 250 OP 250 PS 637: Performed by: OBSTETRICS & GYNECOLOGY

## 2017-07-08 PROCEDURE — 25000125 ZZHC RX 250: Performed by: OBSTETRICS & GYNECOLOGY

## 2017-07-08 PROCEDURE — 99024 POSTOP FOLLOW-UP VISIT: CPT | Mod: GC | Performed by: OBSTETRICS & GYNECOLOGY

## 2017-07-08 PROCEDURE — 80048 BASIC METABOLIC PNL TOTAL CA: CPT | Performed by: OBSTETRICS & GYNECOLOGY

## 2017-07-08 PROCEDURE — 36415 COLL VENOUS BLD VENIPUNCTURE: CPT | Performed by: OBSTETRICS & GYNECOLOGY

## 2017-07-08 PROCEDURE — 85027 COMPLETE CBC AUTOMATED: CPT | Performed by: OBSTETRICS & GYNECOLOGY

## 2017-07-08 PROCEDURE — 12000001 ZZH R&B MED SURG/OB UMMC

## 2017-07-08 PROCEDURE — 25000128 H RX IP 250 OP 636: Performed by: OBSTETRICS & GYNECOLOGY

## 2017-07-08 PROCEDURE — 25000132 ZZH RX MED GY IP 250 OP 250 PS 637: Performed by: STUDENT IN AN ORGANIZED HEALTH CARE EDUCATION/TRAINING PROGRAM

## 2017-07-08 RX ORDER — OXYCODONE HYDROCHLORIDE 5 MG/1
5-10 TABLET ORAL EVERY 4 HOURS PRN
Qty: 30 TABLET | Refills: 0 | Status: SHIPPED | OUTPATIENT
Start: 2017-07-08 | End: 2017-07-09

## 2017-07-08 RX ORDER — HYDROMORPHONE HYDROCHLORIDE 2 MG/1
2-4 TABLET ORAL
Status: DISCONTINUED | OUTPATIENT
Start: 2017-07-08 | End: 2017-07-09 | Stop reason: HOSPADM

## 2017-07-08 RX ADMIN — ENOXAPARIN SODIUM 40 MG: 40 INJECTION SUBCUTANEOUS at 10:47

## 2017-07-08 RX ADMIN — MAGNESIUM HYDROXIDE 15 ML: 400 SUSPENSION ORAL at 09:47

## 2017-07-08 RX ADMIN — OXYCODONE HYDROCHLORIDE 5 MG: 5 TABLET ORAL at 05:01

## 2017-07-08 RX ADMIN — IBUPROFEN 600 MG: 200 TABLET, FILM COATED ORAL at 12:22

## 2017-07-08 RX ADMIN — SENNOSIDES AND DOCUSATE SODIUM 2 TABLET: 8.6; 5 TABLET ORAL at 19:11

## 2017-07-08 RX ADMIN — OXYCODONE HYDROCHLORIDE 5 MG: 5 TABLET ORAL at 03:33

## 2017-07-08 RX ADMIN — ACETAMINOPHEN 650 MG: 325 TABLET, FILM COATED ORAL at 16:38

## 2017-07-08 RX ADMIN — SENNOSIDES AND DOCUSATE SODIUM 2 TABLET: 8.6; 5 TABLET ORAL at 07:44

## 2017-07-08 RX ADMIN — DIPHENHYDRAMINE HYDROCHLORIDE 25 MG: 50 INJECTION, SOLUTION INTRAMUSCULAR; INTRAVENOUS at 10:42

## 2017-07-08 RX ADMIN — IBUPROFEN 600 MG: 200 TABLET, FILM COATED ORAL at 19:11

## 2017-07-08 RX ADMIN — BISACODYL 10 MG: 10 SUPPOSITORY RECTAL at 09:47

## 2017-07-08 RX ADMIN — LISINOPRIL 20 MG: 20 TABLET ORAL at 07:44

## 2017-07-08 RX ADMIN — ONDANSETRON 4 MG: 4 TABLET, ORALLY DISINTEGRATING ORAL at 13:09

## 2017-07-08 RX ADMIN — HYDROMORPHONE HYDROCHLORIDE 2 MG: 2 TABLET ORAL at 20:55

## 2017-07-08 RX ADMIN — SODIUM CHLORIDE, POTASSIUM CHLORIDE, SODIUM LACTATE AND CALCIUM CHLORIDE: 600; 310; 30; 20 INJECTION, SOLUTION INTRAVENOUS at 03:22

## 2017-07-08 RX ADMIN — ACETAMINOPHEN 650 MG: 325 TABLET, FILM COATED ORAL at 10:47

## 2017-07-08 RX ADMIN — IBUPROFEN 600 MG: 200 TABLET, FILM COATED ORAL at 06:21

## 2017-07-08 RX ADMIN — ACETAMINOPHEN 650 MG: 325 TABLET, FILM COATED ORAL at 04:58

## 2017-07-08 RX ADMIN — ONDANSETRON 4 MG: 4 TABLET, ORALLY DISINTEGRATING ORAL at 04:58

## 2017-07-08 ASSESSMENT — PAIN DESCRIPTION - DESCRIPTORS
DESCRIPTORS: SORE
DESCRIPTORS: ACHING
DESCRIPTORS: SORE

## 2017-07-08 NOTE — PLAN OF CARE
Problem: Goal Outcome Summary  Goal: Goal Outcome Summary  Outcome: Improving   VSS. Sats >92% on Room Air. Pain controlled w/ IV dilaudid x1, PO oxycodone 5mg x1 and scheduled tyelonol/ibproufen. Nausea w/ sitting up, improved w/ scheduled PO zofran. Midline incision covered w/ primapore, dried drainage, no change. Denies gas, no BM. Martins removed, due to void by 1100. Sat on edge of bed for extended period, stood up x1. R PIV infusing w/ MIV. Resting off and on throughout night. Continue w/ POC.

## 2017-07-08 NOTE — PLAN OF CARE
"Problem: Goal Outcome Summary  Goal: Goal Outcome Summary  Outcome: Therapy, progress toward functional goals is gradual  VSS. CAPNO wnl. C/o's pain in abd controlled with prn/scheduled pain meds. Abd incision with intact dressing/dried dranage, abd binder in place. +BS, no flatus or BM yet. Good uop albeit maroon to pink(improving) in color- MD aware, will continue to monitor as it could just be from cath irritation?. Very scant vaginal drainage noted in pad. C/o's facial flushing and throat irritation that improved with prn IV benadryl( redness in neck is nothing new per pt). Ambulated with SBA. IS done. Spouse at bedside.  P: continue to monitor incision/drainage, pain, bowel function and continue with POC.    Addendum: 7:13 PM  Pt stated she passed \"a lot of gas\".   "

## 2017-07-08 NOTE — PROGRESS NOTES
Gynecologic Oncology Postoperative Check Note  7/7/2017    S: Patient is resting comfortably in bed. Reports pain is mostly controlled. Had some nausea with food, but that has resolved. Denies SOB, chest pain or dizziness. Novoa catheter is in place.     O:  Vitals:    07/07/17 2055 07/07/17 2110 07/07/17 2125 07/07/17 2155   BP: 127/73 128/74 126/75 131/75   BP Location: Right arm   Right arm   Resp:    22   Temp:    97.9  F (36.6  C)   TempSrc:    Oral   SpO2:   98% 97%   Weight:       Height:           Gen: No acute distress  Cardio: RRR, no murmur  Resp: clear to ascultation, no increased work of breathing  Abdomen: Soft, appropriately tender, non distended  Incision: covered by a bandage with shadowing marked  Extremities: SCDs in place, trace LE edema    A: 46 year old POD#0 s/p XL, QUENTIN, RSO, LS.    Dz:  fibroid uterus, EIN; no malignancy on frozen  FEN: Regular diet,  ml/hr  Pain: Sched tylenol and ibuprofen, PRN IV dilaudid and roxicodone  Heme: Hgb 12.2 >  > AM Hgb pending  CV: HTN, home lisinopril to start 7/8  Pulm: NI  GI: prn antiemetics and bowel regimen ordered  : novoa in place, awaiting spontaneous void  ID: afebrile, received standard pre-op abx  Endo: hypothyroid, no home meds?  Psych/Neuro: NI  PPX: SCDs, SI, Lovenox to start 7/8  Dispo: pending postoperative course    Tayler Marin MD PhD  OB/GYN PGY-2  7/7/2017 10:55 PM

## 2017-07-08 NOTE — PROGRESS NOTES
GYN ONC PROGRESS NOTE  07/08/17    HD#:2 /POD#:1   Disease: Exploratory laparotomy, Abdominal Hysterectomy, Right Salpingo-Oopherectomy, Left Salpingectomy    24 hour events:   - Surgery (the above stated procedure)    Subjective: Patient is feeling ok this morning.  Pain is controlled.  Eating and drinking without nausea or vomiting. Denies chest pain or SOB or dizziness. Her novoa was just removed and she has not spontaneously voided yet. She has ambulated yet.     Objective:   Vitals:    07/07/17 2325 07/08/17 0025 07/08/17 0322 07/08/17 0331   BP: 134/79 136/77 137/75    BP Location: Right arm Right arm Left arm    Pulse:   63    Resp: 17 14 14    Temp:   98.4  F (36.9  C)    TempSrc:   Oral    SpO2: 97% 97% 98% 98%   Weight:       Height:             Gen- alert, no distress, cooperative  CV- Regular rate and rhythm, No murmurs, rubs or gallops  Pulm- Normal - Clear to auscultation without rales, rhonchi, or wheezing. and bilateral air exchange present  Abd- Benign and Soft, flat, appropriately-tender, non distended  Incision- covered with a bandage, shadowing outlined  Extr- well perfused, trace LE edema      I/Os  (Yesterday // Since Midnight)  PO 210cc // 0cc  IVF 2096cc // 0cc  Urine 400cc // 175cc    New Labs/Imaging-   Results for orders placed or performed during the hospital encounter of 07/07/17 (from the past 24 hour(s))   HCG qualitative urine   Result Value Ref Range    HCG Qual Urine Negative NEG       Pending cultures (date obtained): None    Assessment: Ms. Isaias Phoenix is a 46 year old female that is POD#1 from a Exploratory laparotomy, Abdominal Hysterectomy, Right Salpingo-Oopherectomy and Left Salpingectomy for a fibroid uterus.     Active Problem list:    - postoperative state  - HTN      Plan:      FEN: Regular diet,  ml/hr, will discontinue fluids when patient has adequate PO intake  Pain: Sched tylenol and ibuprofen, PRN IV dilaudid and roxicodone  Heme: low concern for acute blood  loss, patient is asymptotic. Morning cbc pending.   CV: HTN, home lisinopril to start today  Pulm: NI  GI: prn antiemetics and bowel regimen ordered  : Martins removed this morning, awaiting spontaneous void  ID: afebrile, received standard pre-op abx  Endo: Hashimoto's thyroiditis, no treatment required  Psych/Neuro: NI  PPX: SCDs, SI, Lovenox   Dispo: pending postoperative course    Tayler Marin MD PhD  Ob/Gyn PGY-2  7/8/2017 5:06 AM    Pager [phone]       The patient was seen and examined with the resident, the labs and vital signs were reviewed.The medical care outlined above was provided under my directives and direct supervision. Good pain control and advancing on post operative goals.    Lisa Juárez MD, MPH  Gynecology Oncology

## 2017-07-09 VITALS
HEIGHT: 67 IN | BODY MASS INDEX: 29.63 KG/M2 | HEART RATE: 84 BPM | WEIGHT: 188.8 LBS | TEMPERATURE: 96.7 F | OXYGEN SATURATION: 95 % | RESPIRATION RATE: 16 BRPM | DIASTOLIC BLOOD PRESSURE: 85 MMHG | SYSTOLIC BLOOD PRESSURE: 141 MMHG

## 2017-07-09 LAB
C DIFF TOX B STL QL: NORMAL
SPECIMEN SOURCE: NORMAL

## 2017-07-09 PROCEDURE — 87493 C DIFF AMPLIFIED PROBE: CPT | Performed by: OBSTETRICS & GYNECOLOGY

## 2017-07-09 PROCEDURE — 25000132 ZZH RX MED GY IP 250 OP 250 PS 637: Performed by: OBSTETRICS & GYNECOLOGY

## 2017-07-09 PROCEDURE — 99024 POSTOP FOLLOW-UP VISIT: CPT | Mod: GC | Performed by: OBSTETRICS & GYNECOLOGY

## 2017-07-09 PROCEDURE — 25000128 H RX IP 250 OP 636: Performed by: OBSTETRICS & GYNECOLOGY

## 2017-07-09 PROCEDURE — 25000132 ZZH RX MED GY IP 250 OP 250 PS 637: Performed by: STUDENT IN AN ORGANIZED HEALTH CARE EDUCATION/TRAINING PROGRAM

## 2017-07-09 RX ORDER — SIMETHICONE 80 MG
80 TABLET,CHEWABLE ORAL 4 TIMES DAILY PRN
Qty: 20 TABLET | Refills: 0 | Status: SHIPPED | OUTPATIENT
Start: 2017-07-09

## 2017-07-09 RX ORDER — HYDROMORPHONE HYDROCHLORIDE 2 MG/1
2-4 TABLET ORAL
Qty: 30 TABLET | Refills: 0 | Status: SHIPPED | OUTPATIENT
Start: 2017-07-09 | End: 2017-07-31

## 2017-07-09 RX ORDER — ACETAMINOPHEN 325 MG/1
650 TABLET ORAL EVERY 6 HOURS
Qty: 50 TABLET | Refills: 0 | Status: SHIPPED | OUTPATIENT
Start: 2017-07-09

## 2017-07-09 RX ORDER — IBUPROFEN 600 MG/1
600 TABLET, FILM COATED ORAL EVERY 6 HOURS PRN
Qty: 30 TABLET | Refills: 0 | Status: SHIPPED | OUTPATIENT
Start: 2017-07-09

## 2017-07-09 RX ADMIN — IBUPROFEN 600 MG: 200 TABLET, FILM COATED ORAL at 08:45

## 2017-07-09 RX ADMIN — SIMETHICONE CHEW TAB 80 MG 80 MG: 80 TABLET ORAL at 08:54

## 2017-07-09 RX ADMIN — HYDROMORPHONE HYDROCHLORIDE 2 MG: 2 TABLET ORAL at 14:49

## 2017-07-09 RX ADMIN — ENOXAPARIN SODIUM 40 MG: 40 INJECTION SUBCUTANEOUS at 10:57

## 2017-07-09 RX ADMIN — LISINOPRIL 20 MG: 20 TABLET ORAL at 08:53

## 2017-07-09 RX ADMIN — IBUPROFEN 600 MG: 200 TABLET, FILM COATED ORAL at 01:16

## 2017-07-09 RX ADMIN — HYDROMORPHONE HYDROCHLORIDE 2 MG: 2 TABLET ORAL at 08:53

## 2017-07-09 RX ADMIN — ACETAMINOPHEN 650 MG: 325 TABLET, FILM COATED ORAL at 10:57

## 2017-07-09 ASSESSMENT — PAIN DESCRIPTION - DESCRIPTORS
DESCRIPTORS: ACHING
DESCRIPTORS: SORE
DESCRIPTORS: ACHING
DESCRIPTORS: ACHING

## 2017-07-09 NOTE — PLAN OF CARE
Problem: Goal Outcome Summary  Goal: Goal Outcome Summary  Outcome: Therapy, progress toward functional goals as expected  Pain managed with oral dilaudid, tylenol, ibuprofen. Tolerating reg diet, good appetite, denies nausea, passing gas, one small BM overnight. Voiding good amts, urine still pink/clear. Incision dressing with small amt dried drainage, abd binder in place. No vaginal bleeding. PIV removed. Up with SBA/ad emily.   PLAN: Discharge today.

## 2017-07-09 NOTE — PLAN OF CARE
Problem: Goal Outcome Summary  Goal: Goal Outcome Summary  Outcome: Adequate for Discharge Date Met:  07/09/17  Pain controlled with oral dilaudid and tylenol.  Midline incision with steristrips replaced this am by this RN.   Lower aspect of incision with small amount of bloody drainage despite replacing steristrips.  Had 4 loose stools this am, placed on enteric precautions, ,monitoring for next bowel movement for testing, no bowel movement since.  Abdominal precautions reviewed.  TOlerating regular diet.    P:  Pain control, dc home with  to drive.      1400P:  Patient had liquid bowel movement 100 cc, sent to lab for Cdiff culture, will call patient w/ results, continue to monitor.

## 2017-07-09 NOTE — DISCHARGE SUMMARY
[facility] Discharge Summary    Fabián Phoenix MRN# [id number]   Age: 46 year old YOB: 1970     Date of Admission:  7/7/2017  Date of Discharge::  7/9/2017  Admitting Physician:  Dr. Cook  Discharge Physician:  Dr. Juárez     Home clinic: [facility]          Admission Diagnoses:   Endometrial intraepithelial neoplasia  Fibroid uterus          Discharge Diagnosis:   Same    Patient Active Problem List   Diagnosis     Herpes simplex virus (HSV) infection     S/P abdominal hysterectomy             Procedures:   Exploratory laparotomy, total abdominal hysterectomy, right salpingo-oophorectomy, left salpingectomy          Medications Prior to Admission:     Prescriptions Prior to Admission   Medication Sig Dispense Refill Last Dose     loratadine-pseudoePHEDrine (CLARITIN-D 24-HOUR)  MG per 24 hr tablet Take 1 tablet by mouth Stopped 6/28/17 for her surgery per her    6/28/2017     Multiple Vitamin (MULTI-VITAMINS) TABS Take 1 tablet by mouth Stopped 6/28/17 for her surgery per her    6/28/2017     lisinopril (PRINIVIL/ZESTRIL) 20 MG tablet Take 20 mg by mouth every morning    7/6/2017 at 0800     [DISCONTINUED] IBUPROFEN PO Take 400 mg by mouth daily as needed for moderate pain   More than a month at Unknown time             Discharge Medications:     Current Discharge Medication List      START taking these medications    Details   acetaminophen (TYLENOL) 325 MG tablet Take 2 tablets (650 mg) by mouth every 6 hours  Qty: 50 tablet, Refills: 0    Associated Diagnoses: Acute post-operative pain      simethicone (MYLICON) 80 MG chewable tablet Take 1 tablet (80 mg) by mouth 4 times daily as needed for cramping (gas)  Qty: 20 tablet, Refills: 0    Associated Diagnoses: S/P abdominal hysterectomy      HYDROmorphone (DILAUDID) 2 MG tablet Take 1-2 tablets (2-4 mg) by mouth every 3 hours as needed for moderate to severe pain  Qty: 30 tablet, Refills: 0     Associated Diagnoses: Acute post-operative pain         CONTINUE these medications which have CHANGED    Details   ibuprofen (ADVIL/MOTRIN) 600 MG tablet Take 1 tablet (600 mg) by mouth every 6 hours as needed for mild pain  Qty: 30 tablet, Refills: 0    Associated Diagnoses: Acute post-operative pain         CONTINUE these medications which have NOT CHANGED    Details   loratadine-pseudoePHEDrine (CLARITIN-D 24-HOUR)  MG per 24 hr tablet Take 1 tablet by mouth Stopped 6/28/17 for her surgery per her DRAnna      Multiple Vitamin (MULTI-VITAMINS) TABS Take 1 tablet by mouth Stopped 6/28/17 for her surgery per her DRAnna      lisinopril (PRINIVIL/ZESTRIL) 20 MG tablet Take 20 mg by mouth every morning                    Consultations:   No consultations were requested during this admission          Brief History of Illness:   Fabián Phoenix is a 46 year old female who was found on endometrial biopsy to have endometrial intraepithelial neoplasia and was sent to Gyn Oncology for consultation.  She was advised to undergo hysterectomy with plan made for open surgery due to the large size of the fibroid uterus.  Risks, benefits and alternatives to proceed discussed in detail with the patient. Risks include but are not limited to bleeding, infection, possible injury to surrounding organs including bowel, bladder, ureter, need for second procedure/surgery related to complications from       first procedure, postoperative medical complications such as cardiopulmonary events, lymphedema, lymphocyst, thromboembolic events. The patient's questions were answered, understanding confirmed, and the patient signed written informed consent.          Hospital Course:   Given the above listed history, surgical management was recommend.  Informed consent was signed and she was admitted to the hospital on 7/7/2017.  She underwent the above stated procedure.  The procedure was uncomplicated with an EBL of 200 cc.  For full  details of the procedure, please see her operative note for details.  The remainder of her hospital course is detailed below in a problem based manner.    Dz:   - Preoperative diagnosis was Endometrial Hyperplasia With Atypia.  Frozen section was negative for malignancy.  Final pathology is pending at the time of discharge.  She will follow-up postoperatively for a care plan.   FEN:   - She was maintained on IVF until POD#1, when her diet was slowly advanced.  By discharge, she was tolerating a regular diet without nausea and vomiting and able to maintain her hydration without IVF supplementation.    Pain:   - Her pain was initially controlled on IV dilaudid. Once tolerating PO pain meds, she was transitioned to a PO pain regimen.  Her pain was well controlled on this and she was discharged home with these medications.  CV:   - Her vital signs were stable while in house and she had no acute CV issues.  She was continued on her home lisinopril dose.  PULM:   - She has no history of pulmonary issues.  She was initially given O2 supplementation in to maintain her O2 sats in the immediate postop period and was transitioned off of this without difficulty.  By discharge, her O2 sats were greater than 94% on RA.  She was encouraged to use her bedside IS while in house.  She had no acute pulmonary issues while in house.  HEME:   - Her preoperative Hgb was 12.2.  Her hgb was stable at 11.4 at the time of discharge.  She had no other acute heme issues while in house.  GI:   - She was made NPO prior to the procedure.  Her diet was advanced to clear liquids and then advanced slowly as tolerated.  At the time of discharge, she was tolerating a regular diet without nausea and vomiting.  She had multiple loose stools the day of discharge.  Her bowel medications were stopped and she had decreased stool output.  Prior to discharge a sample was sent for C. Diff and was negative.  :    -  A novoa catheter was placed at the time of  the surgery.  Once ambulating unassisted, the novoa catheter was removed.  Prior to discharge, the patient was voiding spontaneously without difficulty.  She had no acute  issues while in house.  ID:   - The patient was AF during her hospitalization.  She received standard preoperative antibiotics without incident.    ENDO:   - Pt has no endocrine issues while in house.    PSYCH/NEURO:   - No issues  PPX:    -  She was given SCDs, IS, PPI and lovenox during her hospital course.  She tolerated these prophylactic interventions without incident.  They were discontinued at the time of her discharge.            Discharge Instructions and Follow-Up:   Discharge diet: Regular   Discharge activity: Activity as tolerated  No heavy lifting for 6 weeks  Pelvic rest for 8 weeks   Discharge follow-up: Follow-up appointment with Dr. Cook 7/31/17           Discharge Disposition:   Discharged to home      Rowena Miller MD  OB/GYN PGY4    The patient was seen and examined with the resident, ready for discharge to home.The medical care outlined above was provided under my directives and direct supervision.    Lisa Juárez MD, MPH  Gynecology Oncology

## 2017-07-09 NOTE — PROGRESS NOTES
GYN ONC PROGRESS NOTE  07/09/17    HD#:2 /POD#:2   Disease: Exploratory laparotomy, Abdominal Hysterectomy, Right Salpingo-Oopherectomy, Left Salpingectomy    24 hour events:   - flushed 5 hours after oxycodone, resolved w/benadryl    Subjective: Endorses good pain control.  Denies vaginal bleeding.  No dysuria or hematuria.  Denies nausea or vomiting.  Patient had a few loose stools overnight.  Denies chest pain or shortness of breath.    Objective:   Vitals:    07/08/17 1227 07/08/17 1515 07/08/17 1927 07/08/17 2225   BP: 142/74 137/71 132/76 136/77   BP Location: Right arm Right arm Right arm Right arm   Pulse: 73 89 75 84   Resp: 20 18 18 18   Temp: 97.9  F (36.6  C) 97.6  F (36.4  C) 99.1  F (37.3  C) 98.9  F (37.2  C)   TempSrc: Oral Oral Oral Oral   SpO2: 93% 95% 92% 94%   Weight:       Height:         Gen- resting comfortable, NAD  CV- Regular rate and rhythm, No murmurs, rubs or gallops  Pulm- CTAB  Abd- normoactive bowel sounds, soft, minimally and appropriately tender, non distended  Incision- bandage in place, dry/intact  Extr- wwp, no edema    I/Os  I/O last 3 completed shifts:  In: 2000 [P.O.:2000]  Out: 4050 [Urine:4050]      New Labs/Imaging-   No results found for this or any previous visit (from the past 24 hour(s)).    Pending cultures (date obtained): None    Assessment: Ms. Isaias Phoenix is a 46 year old female that is POD#2 from a Exploratory laparotomy, Abdominal Hysterectomy, Right Salpingo-Oopherectomy and Left Salpingectomy for a fibroid uterus, doing well postoperatively.     Active Problem list:    - postoperative state  - HTN    Plan:      FEN: Regular diet  Pain: Scheduled tylenol and ibuprofen, PRN IV and PO Dilaudid.  Pt felt flushed several hours after oxycodone yesterday, switched to Dilaudid as a precaution but suspicion for allergy is low.  Heme: low concern for acute blood loss, patient is asymptotic. POD1 Hgb stable.  CV: HTN, continue home lisinopril  Pulm: NI  GI: prn  antiemetics.  Pt had loose stools, will hold bowel regimen.  : Martins removed POD1, voiding spontaneously, adequate UOP  ID: afebrile, received standard pre-op abx  Endo: Hashimoto's thyroiditis, patient has not required Synthroid  Psych/Neuro: NI  PPX: SCDs, SI, Lovenox   Dispo: discharge home today if continued favorable postop course    Marianne Tyler MD  PGY-2 OBGYN  Gynecologic Oncology service pager # [phone]

## 2017-07-10 ENCOUNTER — CARE COORDINATION (OUTPATIENT)
Dept: CARE COORDINATION | Facility: CLINIC | Age: 47
End: 2017-07-10

## 2017-07-10 NOTE — PROGRESS NOTES
"[facility]  \"Hello, my name is Kaitlyn John , and I am calling from the [facility].  I want to check in and see how you are doing, after leaving the hospital.  You may also receive a call from your Care Coordinator (care team), but I want to make sure you don t have any urgent needs.  I have a couple questions to review with you:     Post-Discharge Outreach                                                    Fabián Phoenix is a 46 year old female     Follow-up Appointments           Follow Up and recommended labs and tests         Follow-up at your scheduled post-op visit on 7/31                        Your next 10 appointments already scheduled            Jul 31, 2017  1:40 PM CDT   (Arrive by 1:25 PM)   Post-Op with Byron Cook MD   [facility] ([facility]     [address]  [address] [facility] [address] [phone]              Care Team:    Patient Care Team       Relationship Specialty Notifications Start End    Mere Alvarado PCP - General Nurse Practitioner  7/22/15     Comment:  MAMMOGRAM REFERRAL    Phone: [phone] Fax: [phone]         [facility] [address] [facility] [address]    Veronika Baker MD MD OB/Gyn  5/3/17     Phone: [phone] Fax: [phone]         [facility] [address] [place] [id number]            Transition of Care Review                                                      Did you have a surgery or procedure during your hospital visit? Yes   If yes, do you have any of the following:     Signs of infection:  No    Pain:  Yes     Pain Scale (0-10) 2/10     Location: Surg site    Wound/incision concerns? Seeping a little    Do you have all of your medications/refills?  Yes    Are you having any side effects or questions about your medication(s)? No    Do you have any new or worsening symptoms?  No    Do you have " any future appointments scheduled?   Yes             Plan                                                      Thanks for your time.  Your Care Coordinator may follow-up within the next couple days.  In the meantime if you have questions, concerns or problems call your care team.        Kaitlyn John

## 2017-07-12 ENCOUNTER — CARE COORDINATION (OUTPATIENT)
Dept: ONCOLOGY | Facility: CLINIC | Age: 47
End: 2017-07-12

## 2017-07-12 LAB — COPATH REPORT: NORMAL

## 2017-07-12 NOTE — PROGRESS NOTES
Post-Discharge Phone Call:    Pain:  1) Location:Abdominal   2) Rate pain: Tolerable   3) Is your pain well controlled on your pain medication?: Yes  4) How often are you taking your pain medication?: Ibuprofen as prescribed and on schedule. Narcotic at bedtime. However, the patient is waking with headaches and will not take the Narcotic tonight to see if the headaches stop.     GI:  5) Last bowel movement: Today  6) Are you having regular bowel movements? Yes  7) Eating/drinking well?: Yes  8) Nausea?: No    Urinary:  9) Are you having problems or difficulty with urination? No      Lower Extremities:  10) Were lymph nodes removed during surgery?  N/A  11) If yes, have you been offered a lymphedema consult appointment?  N/A  12) Any pain, redness, or swelling in legs?  No  13) Any area on your legs that are warm to touch? No  14) Chest pain or severe shortness of breath? No    Wound:  15) Type of incision: Abdominal  Any of the following:  - Drainage (color, amount): No  - Odor: No  - Redness: No  - Chills: No  - Fever: No  16) Staples - Have you had your staples out yet? (staples should be removed 7-10 days post-op): N/A  17) Pt was reminded to wash incision (allow warm, soapy water to run over incision): Yes    Post-op:  18) Verify date and time of appointment: yes  19) Pt was informed that pathology will be discussed at this appointment Yes    Any other questions or concerns at this time? No     Elisabeth Orona RN

## 2017-07-28 ASSESSMENT — ENCOUNTER SYMPTOMS
VOMITING: 0
BOWEL INCONTINENCE: 0
MUSCLE CRAMPS: 0
NECK PAIN: 0
DIARRHEA: 0
JOINT SWELLING: 0
MUSCLE WEAKNESS: 1
CONSTIPATION: 1
MYALGIAS: 1
STIFFNESS: 0
ABDOMINAL PAIN: 1
RECTAL PAIN: 0
RECTAL BLEEDING: 0
JAUNDICE: 0
BLOATING: 1
BACK PAIN: 0
BLOOD IN STOOL: 0
NAUSEA: 0
ARTHRALGIAS: 0
HEARTBURN: 0

## 2017-07-29 ENCOUNTER — HEALTH MAINTENANCE LETTER (OUTPATIENT)
Age: 47
End: 2017-07-29

## 2017-07-31 ENCOUNTER — OFFICE VISIT (OUTPATIENT)
Dept: ONCOLOGY | Facility: CLINIC | Age: 47
End: 2017-07-31
Attending: OBSTETRICS & GYNECOLOGY
Payer: COMMERCIAL

## 2017-07-31 VITALS
HEART RATE: 79 BPM | HEIGHT: 67 IN | OXYGEN SATURATION: 97 % | WEIGHT: 180.1 LBS | RESPIRATION RATE: 18 BRPM | BODY MASS INDEX: 28.27 KG/M2 | TEMPERATURE: 98.4 F | SYSTOLIC BLOOD PRESSURE: 146 MMHG | DIASTOLIC BLOOD PRESSURE: 92 MMHG

## 2017-07-31 DIAGNOSIS — N85.02 ENDOMETRIAL HYPERPLASIA WITH ATYPIA: Primary | ICD-10-CM

## 2017-07-31 PROCEDURE — 99024 POSTOP FOLLOW-UP VISIT: CPT | Mod: ZP | Performed by: OBSTETRICS & GYNECOLOGY

## 2017-07-31 PROCEDURE — 99212 OFFICE O/P EST SF 10 MIN: CPT | Mod: ZF

## 2017-07-31 ASSESSMENT — PAIN SCALES - GENERAL: PAINLEVEL: NO PAIN (0)

## 2017-07-31 NOTE — MR AVS SNAPSHOT
"              After Visit Summary   7/31/2017    Fabián Phoenix    MRN: [id number]           Patient Information     Date Of Birth          1970        Visit Information        Provider Department      7/31/2017 1:40 PM Byron Cook MD [facility]        Today's Diagnoses     Endometrial hyperplasia with atypia    -  1       Follow-ups after your visit        Who to contact     If you have questions or need follow up information about today's clinic visit or your schedule please contact [facility] directly at [phone].  Normal or non-critical lab and imaging results will be communicated to you by Syntaxinhart, letter or phone within 4 business days after the clinic has received the results. If you do not hear from us within 7 days, please contact the clinic through Appiphanyt or phone. If you have a critical or abnormal lab result, we will notify you by phone as soon as possible.  Submit refill requests through Litehouse or call your pharmacy and they will forward the refill request to us. Please allow 3 business days for your refill to be completed.          Additional Information About Your Visit        MyChart Information     Litehouse gives you secure access to your electronic health record. If you see a primary care provider, you can also send messages to your care team and make appointments. If you have questions, please call your primary care clinic.  If you do not have a primary care provider, please call [phone] and they will assist you.        Care EveryWhere ID     This is your Care EveryWhere ID. This could be used by other organizations to access your [place] medical records  [id number]        Your Vitals Were     Pulse Temperature Respirations Height Last Period Pulse Oximetry    79 98.4  F (36.9  C) (Oral) 18 1.702 m (5' 7.01\") 06/25/2017 97%    BMI (Body Mass Index)                   28.2 kg/m2            Blood Pressure from Last 3 " Encounters:   07/31/17 (!) 146/92   07/09/17 141/85   06/30/17 129/85    Weight from Last 3 Encounters:   07/31/17 81.7 kg (180 lb 1.6 oz)   07/08/17 85.6 kg (188 lb 12.8 oz)   06/30/17 82.4 kg (181 lb 9.6 oz)              Today, you had the following     No orders found for display         Today's Medication Changes          These changes are accurate as of: 7/31/17 11:59 PM.  If you have any questions, ask your nurse or doctor.               These medicines have changed or have updated prescriptions.        Dose/Directions    acetaminophen 325 MG tablet   Commonly known as:  TYLENOL   This may have changed:    - when to take this  - reasons to take this   Used for:  Acute post-operative pain        Dose:  650 mg   Take 2 tablets (650 mg) by mouth every 6 hours   Quantity:  50 tablet   Refills:  0         Stop taking these medicines if you haven't already. Please contact your care team if you have questions.     HYDROmorphone 2 MG tablet   Commonly known as:  DILAUDID   Stopped by:  Byron Cook MD                    Primary Care Provider Office Phone # Fax #    Mere Alvarado [phone]       [place] [address]        Equal Access to Services     SOO CONWAY AH: Opal miguelo Sovick, waaxda luqadaha, qaybta kaalmada adeegyada, kaleigh meadows. So [facility] [phone].    ATENCIÓN: Si habla español, tiene a espinal disposición servicios gratuitos de asistencia lingüística. Llame al [phone].    We comply with applicable federal civil rights laws and [place] laws. We do not discriminate on the basis of race, color, national origin, age, disability sex, sexual orientation or gender identity.            Thank you!     Thank you for choosing [facility]  for your care. Our goal is always to provide you with excellent care. Hearing back from our patients is one way we can continue to improve our services.  Please take a few minutes to complete the written survey that you may receive in the mail after your visit with us. Thank you!             Your Updated Medication List - Protect others around you: Learn how to safely use, store and throw away your medicines at www.disposemymeds.org.          This list is accurate as of: 7/31/17 11:59 PM.  Always use your most recent med list.                   Brand Name Dispense Instructions for use Diagnosis    acetaminophen 325 MG tablet    TYLENOL    50 tablet    Take 2 tablets (650 mg) by mouth every 6 hours    Acute post-operative pain       ibuprofen 600 MG tablet    ADVIL/MOTRIN    30 tablet    Take 1 tablet (600 mg) by mouth every 6 hours as needed for mild pain    Acute post-operative pain       lisinopril 20 MG tablet    PRINIVIL/ZESTRIL     Take 20 mg by mouth every morning        loratadine-pseudoePHEDrine  MG per 24 hr tablet    CLARITIN-D 24-hour     Take 1 tablet by mouth daily as needed Stopped 6/28/17 for her surgery per her         MULTI-VITAMINS Tabs      Take 1 tablet by mouth Stopped 6/28/17 for her surgery per her         simethicone 80 MG chewable tablet    MYLICON    20 tablet    Take 1 tablet (80 mg) by mouth 4 times daily as needed for cramping (gas)    S/P abdominal hysterectomy       VITAMIN C PO      Take 1,000 mg by mouth daily

## 2017-07-31 NOTE — NURSING NOTE
"Oncology Rooming Note    July 31, 2017 1:46 PM   Fabián Phoenix is a 46 year old female who presents for:    Chief Complaint   Patient presents with     Oncology Clinic Visit     Endometrial Hyperplasia with Atypia, Post Op.     Initial Vitals: BP (!) 146/92  Pulse 79  Temp 98.4  F (36.9  C) (Oral)  Resp 18  Ht 1.702 m (5' 7.01\")  Wt 81.7 kg (180 lb 1.6 oz)  LMP 06/25/2017  SpO2 97%  BMI 28.2 kg/m2 Estimated body mass index is 28.2 kg/(m^2) as calculated from the following:    Height as of this encounter: 1.702 m (5' 7.01\").    Weight as of this encounter: 81.7 kg (180 lb 1.6 oz). Body surface area is 1.97 meters squared.  No Pain (0) Comment: Some discomfort at the incision site.   Patient's last menstrual period was 06/25/2017.  Allergies reviewed: Yes  Medications reviewed: Yes    Medications: Medication refills not needed today.  Pharmacy name entered into Mebelrama: [id number] - [place], [place] - [address]    Clinical concerns: None. Dr Cook was NOT notified.    7 minutes for nursing intake (face to face time)     Sofia Blanco LPN              "

## 2017-07-31 NOTE — LETTER
2017       RE: Fabián Phoenix  [address] [place] [address]     Dear Colleague,    Thank you for referring your patient, Fabián Phoenix, to the [facility]. Please see a copy of my visit note below.                Follow Up Notes on Referred Patient    Date: 2017       Dr. Veronika Baker MD  [facility]  [address] [facility] [address]  [place] [address]       RE: Fabián Phoenix  : 1970  SANTA: 2017    Dear Dr. Veronika Baker:    Fabián Phoenix is a 46 year old woman with a diagnosis of complex endometrial hyperplasia with atypia.             Patient presents today for followup.  She has been doing well since surgery.  No nausea, vomiting, fever, chills.  Normal urinary and bowel function.  No vaginal bleeding.           Past Medical History:    Past Medical History:   Diagnosis Date     Abnormal Pap smear     LEEP at [place]     Endometrial hyperplasia with atypia      Hashimoto's thyroiditis      Hypertension 2015    controlled with Lisinopril     PONV (postoperative nausea and vomiting)      Thyroid disease 200x?    I have the thyroid antibody but levels remain normal.     Uterine leiomyoma, unspecified location 2017         Past Surgical History:    Past Surgical History:   Procedure Laterality Date     GYN SURGERY      LEEP     HYSTERECTOMY TOTAL ABDOMINAL, BILATERAL SALPINGO-OOPHORECTOMY, NODE DISSECTION, COMBINED N/A 2017    Procedure: COMBINED HYSTERECTOMY TOTAL ABDOMINAL, SALPINGO-OOPHORECTOMY, NODE DISSECTION;  Exploratory laparotomy, Abdominal Hysterectomy, Right Salpingo-Oopherectomy, Left Salpingectomy.;  Surgeon: Byron Cook MD;  Location:  OR         Health Maintenance Due   Topic Date Due     TETANUS IMMUNIZATION (SYSTEM ASSIGNED)  10/31/1987     PAP SCREENING Q3 YR (SYSTEM ASSIGNED)  10/31/1990     LIPID SCREEN Q5 YR FEMALE (SYSTEM ASSIGNED)  10/31/2014       Current Medications:      Current Outpatient Prescriptions   Medication Sig Dispense Refill     Ascorbic Acid (VITAMIN C PO) Take 1,000 mg by mouth daily       acetaminophen (TYLENOL) 325 MG tablet Take 2 tablets (650 mg) by mouth every 6 hours (Patient taking differently: Take 650 mg by mouth every 6 hours as needed ) 50 tablet 0     simethicone (MYLICON) 80 MG chewable tablet Take 1 tablet (80 mg) by mouth 4 times daily as needed for cramping (gas) 20 tablet 0     loratadine-pseudoePHEDrine (CLARITIN-D 24-HOUR)  MG per 24 hr tablet Take 1 tablet by mouth daily as needed Stopped 6/28/17 for her surgery per her DRAnna       lisinopril (PRINIVIL/ZESTRIL) 20 MG tablet Take 20 mg by mouth every morning        ibuprofen (ADVIL/MOTRIN) 600 MG tablet Take 1 tablet (600 mg) by mouth every 6 hours as needed for mild pain (Patient not taking: Reported on 7/31/2017) 30 tablet 0     Multiple Vitamin (MULTI-VITAMINS) TABS Take 1 tablet by mouth Stopped 6/28/17 for her surgery per her DRAnna           Allergies:        Allergies   Allergen Reactions     Sulfa Drugs Rash        Social History:     Social History   Substance Use Topics     Smoking status: Former Smoker     Years: 17.00     Types: Cigarettes     Start date: 9/1/1984     Quit date: 6/1/2002     Smokeless tobacco: Not on file     Alcohol use Yes      Comment: 3-4 drinks/week        History   Drug Use No         Family History:       Family History   Problem Relation Age of Onset     Hypertension Mother      Heart Failure Mother      HEART DISEASE Mother      Coronary Artery Disease Father      DIABETES Father      KIDNEY DISEASE Father      Hypertension Father      Hypertension Brother      Abdominal Aortic Aneurysm Paternal Grandmother      Prostate Cancer Brother      S/P prostatectomy at age 61     Heart Failure Paternal Grandfather      HEART DISEASE Paternal Grandfather          Physical Exam:     BP (!) 146/92  Pulse 79  Temp 98.4  F (36.9  C) (Oral)  Resp 18  Ht 1.702 m (5'  "7.01\")  Wt 81.7 kg (180 lb 1.6 oz)  LMP 06/25/2017  SpO2 97%  BMI 28.2 kg/m2  Body mass index is 28.2 kg/(m^2).    General Appearance: healthy and alert, no distress     ABDOMEN:  Soft, nontender, nondistended, no organomegaly.  Well-healing midline incisions.   PELVIC EXAM:  Normal external genitalia, normal vaginal mucosa.  Well-healing vaginal cuff, intact, confirmed on bimanual exam.             Assessment:    Fabián Phoenix is a 46 year old woman with a diagnosis of complex endometrial hyperplasia with atypia.     A total of 20 minutes was spent with the patient, 15 minutes of which were spent in counseling the patient and/or treatment planning.      1.  Complex endometrial hyperplasia with atypia.   2.  Left ovarian preservation.      I discussed with the patient that all her findings were benign.  She does not need any further followup.  She is surgically dismissed.  She will follow up with Dr. Woods for a yearly GYN visit.  She currently has not had any postmenopausal symptoms.  She still has left ovary in situ.  If she were to develop symptoms, we discussed in general terms the possibility of hormone replacement therapy versus symptomatic treatment with SSRIs.  The patient agrees with this plan.  She is very appreciative of her care.  All questions were answered.       Byron Cook MD, MS    Department of Obstetrics and Gynecology   Division of Gynecologic Oncology   [facility]  Phone: [phone]        CC  Patient Care Team:  Mere Alvarado as PCP - General (Nurse Practitioner)  Yair Woods MD as MD (OB/Gyn)  YAIR WOODS    Again, thank you for allowing me to participate in the care of your patient.      Sincerely,    Byron Cook MD      "

## 2017-08-01 NOTE — PROGRESS NOTES
Follow Up Notes on Referred Patient    Date: 2017       Dr. Veronika Baker MD  [facility]  [address] [place], [place] [address] [facility] [address]  [place] [address]       RE: Fabián Phoenix  : 1970  SANTA: 2017    Dear Dr. Veronika Baker:    Fabián Phoenix is a 46 year old woman with a diagnosis of complex endometrial hyperplasia with atypia.             Patient presents today for followup.  She has been doing well since surgery.  No nausea, vomiting, fever, chills.  Normal urinary and bowel function.  No vaginal bleeding.           Past Medical History:    Past Medical History:   Diagnosis Date     Abnormal Pap smear     LEEP at [place]     Endometrial hyperplasia with atypia      Hashimoto's thyroiditis      Hypertension 2015    controlled with Lisinopril     PONV (postoperative nausea and vomiting)      Thyroid disease 200x?    I have the thyroid antibody but levels remain normal.     Uterine leiomyoma, unspecified location 2017         Past Surgical History:    Past Surgical History:   Procedure Laterality Date     GYN SURGERY      LEEP     HYSTERECTOMY TOTAL ABDOMINAL, BILATERAL SALPINGO-OOPHORECTOMY, NODE DISSECTION, COMBINED N/A 2017    Procedure: COMBINED HYSTERECTOMY TOTAL ABDOMINAL, SALPINGO-OOPHORECTOMY, NODE DISSECTION;  Exploratory laparotomy, Abdominal Hysterectomy, Right Salpingo-Oopherectomy, Left Salpingectomy.;  Surgeon: Byron Cook MD;  Location:  OR         Health Maintenance Due   Topic Date Due     TETANUS IMMUNIZATION (SYSTEM ASSIGNED)  10/31/1987     PAP SCREENING Q3 YR (SYSTEM ASSIGNED)  10/31/1990     LIPID SCREEN Q5 YR FEMALE (SYSTEM ASSIGNED)  10/31/2014       Current Medications:     Current Outpatient Prescriptions   Medication Sig Dispense Refill     Ascorbic Acid (VITAMIN C PO) Take 1,000 mg by mouth daily       acetaminophen (TYLENOL) 325 MG tablet Take 2 tablets (650 mg) by mouth every 6 hours (Patient taking  "differently: Take 650 mg by mouth every 6 hours as needed ) 50 tablet 0     simethicone (MYLICON) 80 MG chewable tablet Take 1 tablet (80 mg) by mouth 4 times daily as needed for cramping (gas) 20 tablet 0     loratadine-pseudoePHEDrine (CLARITIN-D 24-HOUR)  MG per 24 hr tablet Take 1 tablet by mouth daily as needed Stopped 6/28/17 for her surgery per her        lisinopril (PRINIVIL/ZESTRIL) 20 MG tablet Take 20 mg by mouth every morning        ibuprofen (ADVIL/MOTRIN) 600 MG tablet Take 1 tablet (600 mg) by mouth every 6 hours as needed for mild pain (Patient not taking: Reported on 7/31/2017) 30 tablet 0     Multiple Vitamin (MULTI-VITAMINS) TABS Take 1 tablet by mouth Stopped 6/28/17 for her surgery per her            Allergies:        Allergies   Allergen Reactions     Sulfa Drugs Rash        Social History:     Social History   Substance Use Topics     Smoking status: Former Smoker     Years: 17.00     Types: Cigarettes     Start date: 9/1/1984     Quit date: 6/1/2002     Smokeless tobacco: Not on file     Alcohol use Yes      Comment: 3-4 drinks/week        History   Drug Use No         Family History:       Family History   Problem Relation Age of Onset     Hypertension Mother      Heart Failure Mother      HEART DISEASE Mother      Coronary Artery Disease Father      DIABETES Father      KIDNEY DISEASE Father      Hypertension Father      Hypertension Brother      Abdominal Aortic Aneurysm Paternal Grandmother      Prostate Cancer Brother      S/P prostatectomy at age 61     Heart Failure Paternal Grandfather      HEART DISEASE Paternal Grandfather          Physical Exam:     BP (!) 146/92  Pulse 79  Temp 98.4  F (36.9  C) (Oral)  Resp 18  Ht 1.702 m (5' 7.01\")  Wt 81.7 kg (180 lb 1.6 oz)  LMP 06/25/2017  SpO2 97%  BMI 28.2 kg/m2  Body mass index is 28.2 kg/(m^2).    General Appearance: healthy and alert, no distress     ABDOMEN:  Soft, nontender, nondistended, no organomegaly.  " Well-healing midline incisions.   PELVIC EXAM:  Normal external genitalia, normal vaginal mucosa.  Well-healing vaginal cuff, intact, confirmed on bimanual exam.             Assessment:    Fabián Phoenix is a 46 year old woman with a diagnosis of complex endometrial hyperplasia with atypia.     A total of 20 minutes was spent with the patient, 15 minutes of which were spent in counseling the patient and/or treatment planning.      1.  Complex endometrial hyperplasia with atypia.   2.  Left ovarian preservation.      I discussed with the patient that all her findings were benign.  She does not need any further followup.  She is surgically dismissed.  She will follow up with Dr. Woods for a yearly GYN visit.  She currently has not had any postmenopausal symptoms.  She still has left ovary in situ.  If she were to develop symptoms, we discussed in general terms the possibility of hormone replacement therapy versus symptomatic treatment with SSRIs.  The patient agrees with this plan.  She is very appreciative of her care.  All questions were answered.       Byron Cook MD, MS    Department of Obstetrics and Gynecology   Division of Gynecologic Oncology   [facility]  Phone: [phone]        CC  Patient Care Team:  Mere Alvarado as PCP - General (Nurse Practitioner)  Yair Woods MD as MD (OB/Gyn)  YAIR WOODS

## 2019-11-04 ENCOUNTER — HEALTH MAINTENANCE LETTER (OUTPATIENT)
Age: 49
End: 2019-11-04

## 2020-11-22 ENCOUNTER — HEALTH MAINTENANCE LETTER (OUTPATIENT)
Age: 50
End: 2020-11-22

## 2021-02-13 ENCOUNTER — HEALTH MAINTENANCE LETTER (OUTPATIENT)
Age: 51
End: 2021-02-13

## 2021-09-18 ENCOUNTER — HEALTH MAINTENANCE LETTER (OUTPATIENT)
Age: 51
End: 2021-09-18

## 2022-01-08 ENCOUNTER — HEALTH MAINTENANCE LETTER (OUTPATIENT)
Age: 52
End: 2022-01-08

## 2022-03-05 ENCOUNTER — HEALTH MAINTENANCE LETTER (OUTPATIENT)
Age: 52
End: 2022-03-05

## 2022-11-20 ENCOUNTER — HEALTH MAINTENANCE LETTER (OUTPATIENT)
Age: 52
End: 2022-11-20

## 2023-04-15 ENCOUNTER — HEALTH MAINTENANCE LETTER (OUTPATIENT)
Age: 53
End: 2023-04-15

## (undated) DEVICE — COVER CAMERA IN-LIGHT DISP LT-C02

## (undated) DEVICE — DRAPE LEGGINGS CLEAR 8430

## (undated) DEVICE — SU VICRYL 2-0 SH 27" UND J417H

## (undated) DEVICE — SUCTION MANIFOLD DORNOCH ULTRA CART UL-CL500

## (undated) DEVICE — DRSG STERI STRIP 1/2X4" R1547

## (undated) DEVICE — LINEN TOWEL PACK X6 WHITE 5487

## (undated) DEVICE — SU PDS II 0 TP-1 60" Z991G

## (undated) DEVICE — SOL ADH LIQUID BENZOIN SWAB 0.6ML C1544

## (undated) DEVICE — Device

## (undated) DEVICE — PAD PERI INDIV WRAP 11" 2022A

## (undated) DEVICE — DRSG MEDIPORE 3 1/2X13 3/4" 3573

## (undated) DEVICE — SU VICRYL 0 CT-1 36" J346H

## (undated) DEVICE — PANTIES MESH LG/XLG 2PK 706M2

## (undated) DEVICE — SU VICRYL 0 CT-1 CR 8X27" UND JJ41G

## (undated) DEVICE — PREP CHLORAPREP 26ML TINTED ORANGE  260815

## (undated) DEVICE — LINEN TOWEL PACK X30 5481

## (undated) DEVICE — SU MONOCRYL 3-0 PS-1 27" Y936H

## (undated) DEVICE — PREP POVIDONE IODINE SOLUTION 10% 120ML

## (undated) DEVICE — BLADE CLIPPER SGL USE 9680

## (undated) DEVICE — SU VICRYL 0 TIE 54" J608H

## (undated) DEVICE — DRAPE SHEET REV FOLD 3/4 9349

## (undated) DEVICE — PREP POVIDONE IODINE SCRUB 7.5% 120ML

## (undated) DEVICE — PACK AB HYST II

## (undated) RX ORDER — CEFAZOLIN SODIUM 1 G/3ML
INJECTION, POWDER, FOR SOLUTION INTRAMUSCULAR; INTRAVENOUS
Status: DISPENSED
Start: 2017-07-07

## (undated) RX ORDER — HYDROMORPHONE HCL/0.9% NACL/PF 0.2MG/0.2
SYRINGE (ML) INTRAVENOUS
Status: DISPENSED
Start: 2017-07-07

## (undated) RX ORDER — PROPOFOL 10 MG/ML
INJECTION, EMULSION INTRAVENOUS
Status: DISPENSED
Start: 2017-07-07

## (undated) RX ORDER — IBUPROFEN 200 MG
TABLET ORAL
Status: DISPENSED
Start: 2017-07-07

## (undated) RX ORDER — FENTANYL CITRATE 50 UG/ML
INJECTION, SOLUTION INTRAMUSCULAR; INTRAVENOUS
Status: DISPENSED
Start: 2017-07-07

## (undated) RX ORDER — ROCURONIUM BROMIDE 50 MG/5 ML
SYRINGE (ML) INTRAVENOUS
Status: DISPENSED
Start: 2017-07-07

## (undated) RX ORDER — DEXAMETHASONE SODIUM PHOSPHATE 4 MG/ML
INJECTION, SOLUTION INTRA-ARTICULAR; INTRALESIONAL; INTRAMUSCULAR; INTRAVENOUS; SOFT TISSUE
Status: DISPENSED
Start: 2017-07-07

## (undated) RX ORDER — SODIUM CHLORIDE, SODIUM LACTATE, POTASSIUM CHLORIDE, CALCIUM CHLORIDE 600; 310; 30; 20 MG/100ML; MG/100ML; MG/100ML; MG/100ML
INJECTION, SOLUTION INTRAVENOUS
Status: DISPENSED
Start: 2017-07-07

## (undated) RX ORDER — GLYCOPYRROLATE 0.2 MG/ML
INJECTION, SOLUTION INTRAMUSCULAR; INTRAVENOUS
Status: DISPENSED
Start: 2017-07-07

## (undated) RX ORDER — ALBUMIN, HUMAN INJ 5% 5 %
SOLUTION INTRAVENOUS
Status: DISPENSED
Start: 2017-07-07

## (undated) RX ORDER — ACETAMINOPHEN 325 MG/1
TABLET ORAL
Status: DISPENSED
Start: 2017-07-07

## (undated) RX ORDER — ONDANSETRON 2 MG/ML
INJECTION INTRAMUSCULAR; INTRAVENOUS
Status: DISPENSED
Start: 2017-07-07

## (undated) RX ORDER — LIDOCAINE HYDROCHLORIDE 20 MG/ML
INJECTION, SOLUTION EPIDURAL; INFILTRATION; INTRACAUDAL; PERINEURAL
Status: DISPENSED
Start: 2017-07-07